# Patient Record
Sex: FEMALE | Race: WHITE | Employment: OTHER | URBAN - METROPOLITAN AREA
[De-identification: names, ages, dates, MRNs, and addresses within clinical notes are randomized per-mention and may not be internally consistent; named-entity substitution may affect disease eponyms.]

---

## 2021-04-20 ENCOUNTER — APPOINTMENT (OUTPATIENT)
Dept: GENERAL RADIOLOGY | Age: 76
DRG: 084 | End: 2021-04-20
Payer: MEDICARE

## 2021-04-20 ENCOUNTER — HOSPITAL ENCOUNTER (INPATIENT)
Age: 76
LOS: 6 days | Discharge: HOME | DRG: 084 | End: 2021-04-26
Attending: EMERGENCY MEDICINE | Admitting: SURGERY
Payer: MEDICARE

## 2021-04-20 ENCOUNTER — APPOINTMENT (OUTPATIENT)
Dept: CT IMAGING | Age: 76
DRG: 084 | End: 2021-04-20
Payer: MEDICARE
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
## 2021-04-20 DIAGNOSIS — S06.5XAA SUBDURAL HEMATOMA: Primary | ICD-10-CM

## 2021-04-20 PROBLEM — S00.81XA FACIAL ABRASION, INITIAL ENCOUNTER: Status: ACTIVE | Noted: 2021-04-20

## 2021-04-20 PROBLEM — S06.0XAA CONCUSSION: Status: ACTIVE | Noted: 2021-04-20

## 2021-04-20 LAB
ABO/RH: NORMAL
ALBUMIN SERPL-MCNC: 3.1 G/DL (ref 3.5–5.2)
ALP BLD-CCNC: 137 U/L (ref 35–104)
ALT SERPL-CCNC: 34 U/L (ref 0–32)
ANGLE (CLOT STRENGTH): 78.6 DEGREE (ref 59–74)
ANION GAP SERPL CALCULATED.3IONS-SCNC: 10 MMOL/L (ref 7–16)
ANTIBODY SCREEN: NORMAL
APTT: 31.8 SEC (ref 24.5–35.1)
AST SERPL-CCNC: 29 U/L (ref 0–31)
BASOPHILS ABSOLUTE: 0.02 E9/L (ref 0–0.2)
BASOPHILS RELATIVE PERCENT: 0.3 % (ref 0–2)
BILIRUB SERPL-MCNC: 0.2 MG/DL (ref 0–1.2)
BUN BLDV-MCNC: 25 MG/DL (ref 8–23)
CALCIUM SERPL-MCNC: 9 MG/DL (ref 8.6–10.2)
CHLORIDE BLD-SCNC: 100 MMOL/L (ref 98–107)
CO2: 29 MMOL/L (ref 22–29)
CREAT SERPL-MCNC: 1.1 MG/DL (ref 0.5–1)
EOSINOPHILS ABSOLUTE: 0.07 E9/L (ref 0.05–0.5)
EOSINOPHILS RELATIVE PERCENT: 1 % (ref 0–6)
EPL-TEG: 0.8 % (ref 0–15)
G-TEG: 15.3 K D/SC (ref 4.5–11)
GFR AFRICAN AMERICAN: 59
GFR NON-AFRICAN AMERICAN: 48 ML/MIN/1.73
GLUCOSE BLD-MCNC: 450 MG/DL (ref 74–99)
HCT VFR BLD CALC: 32.1 % (ref 34–48)
HEMOGLOBIN: 10.3 G/DL (ref 11.5–15.5)
IMMATURE GRANULOCYTES #: 0.03 E9/L
IMMATURE GRANULOCYTES %: 0.4 % (ref 0–5)
INR BLD: 1.3
K (CLOTTING TIME): 0.8 MIN (ref 1–3)
LY30 (FIBRINOLYSIS): 0.8 % (ref 0–8)
LYMPHOCYTES ABSOLUTE: 1.59 E9/L (ref 1.5–4)
LYMPHOCYTES RELATIVE PERCENT: 23.7 % (ref 20–42)
MA (MAX AMPLITUDE): 75.4 MM (ref 50–70)
MCH RBC QN AUTO: 27.7 PG (ref 26–35)
MCHC RBC AUTO-ENTMCNC: 32.1 % (ref 32–34.5)
MCV RBC AUTO: 86.3 FL (ref 80–99.9)
MONOCYTES ABSOLUTE: 0.51 E9/L (ref 0.1–0.95)
MONOCYTES RELATIVE PERCENT: 7.6 % (ref 2–12)
NEUTROPHILS ABSOLUTE: 4.49 E9/L (ref 1.8–7.3)
NEUTROPHILS RELATIVE PERCENT: 67 % (ref 43–80)
PDW BLD-RTO: 13.2 FL (ref 11.5–15)
PLATELET # BLD: 294 E9/L (ref 130–450)
PMV BLD AUTO: 9.6 FL (ref 7–12)
POTASSIUM SERPL-SCNC: 4.8 MMOL/L (ref 3.5–5)
PROTHROMBIN TIME: 15 SEC (ref 9.3–12.4)
R (REACTION TIME): 4.8 MIN (ref 5–10)
RBC # BLD: 3.72 E12/L (ref 3.5–5.5)
SODIUM BLD-SCNC: 139 MMOL/L (ref 132–146)
TOTAL PROTEIN: 6.4 G/DL (ref 6.4–8.3)
WBC # BLD: 6.7 E9/L (ref 4.5–11.5)

## 2021-04-20 PROCEDURE — 73130 X-RAY EXAM OF HAND: CPT

## 2021-04-20 PROCEDURE — 85025 COMPLETE CBC W/AUTO DIFF WBC: CPT

## 2021-04-20 PROCEDURE — 2060000000 HC ICU INTERMEDIATE R&B

## 2021-04-20 PROCEDURE — 85347 COAGULATION TIME ACTIVATED: CPT

## 2021-04-20 PROCEDURE — 71045 X-RAY EXAM CHEST 1 VIEW: CPT

## 2021-04-20 PROCEDURE — 99284 EMERGENCY DEPT VISIT MOD MDM: CPT

## 2021-04-20 PROCEDURE — 80053 COMPREHEN METABOLIC PANEL: CPT

## 2021-04-20 PROCEDURE — 85384 FIBRINOGEN ACTIVITY: CPT

## 2021-04-20 PROCEDURE — 72170 X-RAY EXAM OF PELVIS: CPT

## 2021-04-20 PROCEDURE — 85730 THROMBOPLASTIN TIME PARTIAL: CPT

## 2021-04-20 PROCEDURE — 85610 PROTHROMBIN TIME: CPT

## 2021-04-20 PROCEDURE — 86850 RBC ANTIBODY SCREEN: CPT

## 2021-04-20 PROCEDURE — 70450 CT HEAD/BRAIN W/O DYE: CPT

## 2021-04-20 PROCEDURE — 86900 BLOOD TYPING SEROLOGIC ABO: CPT

## 2021-04-20 PROCEDURE — 85576 BLOOD PLATELET AGGREGATION: CPT

## 2021-04-20 PROCEDURE — 86901 BLOOD TYPING SEROLOGIC RH(D): CPT

## 2021-04-20 PROCEDURE — 73552 X-RAY EXAM OF FEMUR 2/>: CPT

## 2021-04-20 RX ORDER — BACITRACIN, NEOMYCIN, POLYMYXIN B 400; 3.5; 5 [USP'U]/G; MG/G; [USP'U]/G
OINTMENT TOPICAL 2 TIMES DAILY
Status: DISCONTINUED | OUTPATIENT
Start: 2021-04-20 | End: 2021-04-26 | Stop reason: HOSPADM

## 2021-04-20 RX ORDER — INSULIN GLARGINE 100 [IU]/ML
INJECTION, SOLUTION SUBCUTANEOUS NIGHTLY
COMMUNITY

## 2021-04-20 RX ORDER — ASPIRIN 81 MG/1
81 TABLET ORAL DAILY
COMMUNITY

## 2021-04-20 RX ORDER — ONDANSETRON 4 MG/1
4 TABLET, ORALLY DISINTEGRATING ORAL EVERY 8 HOURS PRN
Status: DISCONTINUED | OUTPATIENT
Start: 2021-04-20 | End: 2021-04-26 | Stop reason: HOSPADM

## 2021-04-20 RX ORDER — ACETAMINOPHEN 325 MG/1
650 TABLET ORAL EVERY 4 HOURS
Status: DISCONTINUED | OUTPATIENT
Start: 2021-04-20 | End: 2021-04-21

## 2021-04-20 RX ORDER — DEXTROSE MONOHYDRATE 25 G/50ML
12.5 INJECTION, SOLUTION INTRAVENOUS PRN
Status: DISCONTINUED | OUTPATIENT
Start: 2021-04-20 | End: 2021-04-26 | Stop reason: HOSPADM

## 2021-04-20 RX ORDER — ONDANSETRON 2 MG/ML
4 INJECTION INTRAMUSCULAR; INTRAVENOUS EVERY 6 HOURS PRN
Status: DISCONTINUED | OUTPATIENT
Start: 2021-04-20 | End: 2021-04-26 | Stop reason: HOSPADM

## 2021-04-20 RX ORDER — SODIUM CHLORIDE 0.9 % (FLUSH) 0.9 %
10 SYRINGE (ML) INJECTION PRN
Status: DISCONTINUED | OUTPATIENT
Start: 2021-04-20 | End: 2021-04-26 | Stop reason: HOSPADM

## 2021-04-20 RX ORDER — INSULIN GLARGINE 100 [IU]/ML
22 INJECTION, SOLUTION SUBCUTANEOUS EVERY MORNING
COMMUNITY

## 2021-04-20 RX ORDER — MULTIVIT WITH MINERALS/LUTEIN
1000 TABLET ORAL DAILY
COMMUNITY

## 2021-04-20 RX ORDER — ATORVASTATIN CALCIUM 40 MG/1
40 TABLET, FILM COATED ORAL DAILY
COMMUNITY

## 2021-04-20 RX ORDER — SODIUM CHLORIDE 0.9 % (FLUSH) 0.9 %
10 SYRINGE (ML) INJECTION EVERY 12 HOURS SCHEDULED
Status: DISCONTINUED | OUTPATIENT
Start: 2021-04-20 | End: 2021-04-26 | Stop reason: HOSPADM

## 2021-04-20 RX ORDER — LEVETIRACETAM 500 MG/1
500 TABLET ORAL 2 TIMES DAILY
Status: DISCONTINUED | OUTPATIENT
Start: 2021-04-20 | End: 2021-04-26 | Stop reason: HOSPADM

## 2021-04-20 RX ORDER — INSULIN ASPART 100 [IU]/ML
INJECTION, SOLUTION INTRAVENOUS; SUBCUTANEOUS
COMMUNITY

## 2021-04-20 RX ORDER — SODIUM CHLORIDE 9 MG/ML
25 INJECTION, SOLUTION INTRAVENOUS PRN
Status: DISCONTINUED | OUTPATIENT
Start: 2021-04-20 | End: 2021-04-26 | Stop reason: HOSPADM

## 2021-04-20 RX ORDER — DEXTROSE MONOHYDRATE 50 MG/ML
100 INJECTION, SOLUTION INTRAVENOUS PRN
Status: DISCONTINUED | OUTPATIENT
Start: 2021-04-20 | End: 2021-04-26 | Stop reason: HOSPADM

## 2021-04-20 RX ORDER — NICOTINE POLACRILEX 4 MG
15 LOZENGE BUCCAL PRN
Status: DISCONTINUED | OUTPATIENT
Start: 2021-04-20 | End: 2021-04-26 | Stop reason: HOSPADM

## 2021-04-20 RX ORDER — NEOMYCIN SULFATE, POLYMYXIN B SULFATE AND BACITRACIN ZINC 3.5; 10000; 4 MG/G; [USP'U]/G; [USP'U]/G
OINTMENT OPHTHALMIC EVERY 6 HOURS SCHEDULED
Status: DISCONTINUED | OUTPATIENT
Start: 2021-04-20 | End: 2021-04-26 | Stop reason: HOSPADM

## 2021-04-20 RX ORDER — SENNA AND DOCUSATE SODIUM 50; 8.6 MG/1; MG/1
2 TABLET, FILM COATED ORAL 2 TIMES DAILY
Status: DISCONTINUED | OUTPATIENT
Start: 2021-04-20 | End: 2021-04-26 | Stop reason: HOSPADM

## 2021-04-20 RX ORDER — CLOPIDOGREL BISULFATE 75 MG/1
75 TABLET ORAL DAILY
COMMUNITY

## 2021-04-20 RX ORDER — SODIUM CHLORIDE 9 MG/ML
INJECTION, SOLUTION INTRAVENOUS CONTINUOUS
Status: DISCONTINUED | OUTPATIENT
Start: 2021-04-20 | End: 2021-04-22

## 2021-04-20 ASSESSMENT — PAIN DESCRIPTION - LOCATION: LOCATION: HEAD

## 2021-04-20 ASSESSMENT — PAIN DESCRIPTION - PAIN TYPE
TYPE: ACUTE PAIN
TYPE: ACUTE PAIN

## 2021-04-20 NOTE — H&P
TRAUMA HISTORY & PHYSICAL  Surgical Resident/Advance Practice Nurse  4/20/2021  6:33 PM    PRIMARY SURVEY    CHIEF COMPLAINT:  Trauma consult. Injury occurred this morning. Pt is a 77year old female that was transferred from [facility]. She states that she had a mechanical fall this morning where she tripped over her feet. She states she hit her head, but did not lose consciousness. She is complaining of a mild headache and face pain. She also complains of some left hand pain from when she landed. She has a history of CAD s/p multiple stents and questionable Afib for which she takes ASA/Plavix and Eliquis. She denies any nausea, vomiting, or changes in vision. AIRWAY:   Airway Normal  EMS ETT Absent  Noisy respirations Absent  Retractions: Absent  Vomiting/bleeding: Absent      BREATHING:    Midaxillary breath sound left:  Normal  Midaxillary breath sound right:  Normal    Cough sound intensity:  good   FiO2: Room Air  SMI 1500 mL.       CIRCULATION:   Femerol pulse intensity: Strong  Palpebral conjunctiva: Pink       Vitals:    04/20/21 1733   BP: (!) 109/54   Pulse: 101   Resp: 20   Temp: 97.7 °F (36.5 °C)   SpO2: 94%       Vitals:    04/20/21 1733   BP: (!) 109/54   Pulse: 101   Resp: 20   Temp: 97.7 °F (36.5 °C)   TempSrc: Infrared   SpO2: 94%   Weight: 145 lb (65.8 kg)   Height: 5' 6\" (1.676 m)        FAST EXAM: Deferred    Central Nervous System    GCS Initial 15 minutes   Eye  Motor  Verbal 4 - Opens eyes on own  6 - Follows simple motor commands  5 - Alert and oriented 4 - Opens eyes on own  6 - Follows simple motor commands  5 - Alert and oriented     Neuromuscular blockade: No  Pupil size:  Left 4 mm    Right 4 mm  Pupil reaction: Yes    Wiggles fingers: Left Yes Right Yes  Wiggles toes: Left Yes   Right No toes on the right    Hand grasp:   Left  Present      Right  Present  Plantar flexion: Left  Present      Right   Present    Loss of consciousness:  No  History Obtained From:  Patient & EMS Private Medical Doctor: Sofi Turcios MD      Pre-exisiting Medical History:  yes    Conditions: CAD, HLD, DM, ? Afib    Medications: ASA/Plavix, Eliquis, vitamins, \"many other medications\"    Allergies: NKDA    Social History:   Tobacco use:  none  Alcohol use:  none  Illicit drug use:  no history of illicit drug use    Past Surgical History:  Right toe amputations, open cholecystectomy    Anticoagulant use:  Yes Eliquis  Antiplatelet use: Yes ASA/Plavix    NSAID use in last 72 hours: no  Taken PCN in past:  unknown  Last food/drink: Unknown  Last tetanus: Unknown    Family History:   Not pertinent to presenting problem. Complaints:   Head:  Mild  Neck:   None  Chest:   None  Back:   None  Abdomen:   None  Extremities:   Mild  Comments: headache and right hand pain    Review of systems:  All negative unless otherwise noted. SECONDARY SURVEY  Head/scalp: Cepahlohematoma to forehead    Face: Abrasion to left cheek    Eyes/ears/nose: Abrasion to nose    Pharynx/mouth: Atraumatic    Neck: Atraumatic     Cervical spine tenderness:   Cervical collar not indicated  Pain:  none  ROM:  Full    Chest wall:  Atraumatic    Heart:  Regular rate & rhythm    Abdomen: Atraumatic. Soft ND  Tenderness:  none    Pelvis: Atraumatic  Tenderness: none    Thoracolumbar spine: Atraumatic  Tenderness:  none    Genitourinary:  Atraumatic. No blood or urine noted    Rectum: Atraumatic. No blood noted. Perineum: Atraumatic. No blood or urine noted.       Extremities:   RUE Abrasion over hand with ecchymosis to right thumb and middle digit  LUE Atraumatic  RLE Prev toe amputations with ulceration to plantar surface of foot  LLE Atraumatic  Sensory normal  Motor normal    Distal Pulses  Left arm normal  Right arm normal  Left leg normal  Right leg normal    Capillary refill  Left arm normal  Right arm normal  Left leg normal  Right leg normal    Procedures in ED:  None    In the event of Emergency Blood Transfusion:  Due to the critical condition of this patient, I request the immediate release of blood products for emergency transfusion secondary to shock. I understand the increased risks incurred by the lack of complete transfusion testing.       Radiology: CT Head  and CT Cervical spine     Consultations:  Neurosurgery    Admission/Diagnosis: Trauma, SDH    Plan of Treatment:  - Admit to tele  - Tert  - Labs, TEG  - repeat head CT @ 7pm  - Keppra BID x 7 days  - Hold anticoagulation  - NSG consult  - Pain control  - Local wound care to abrasions of face and right hand  - Right hand XR    Plan discussed with Dr. Colton Otero at 4/20/2021 on 6:33 PM    Electronically signed by Cristian Morgan MD on 4/20/2021 at 6:33 PM

## 2021-04-21 LAB
ANION GAP SERPL CALCULATED.3IONS-SCNC: 9 MMOL/L (ref 7–16)
BASOPHILS ABSOLUTE: 0.03 E9/L (ref 0–0.2)
BASOPHILS RELATIVE PERCENT: 0.4 % (ref 0–2)
BUN BLDV-MCNC: 21 MG/DL (ref 8–23)
CALCIUM SERPL-MCNC: 9.2 MG/DL (ref 8.6–10.2)
CHLORIDE BLD-SCNC: 100 MMOL/L (ref 98–107)
CO2: 31 MMOL/L (ref 22–29)
CREAT SERPL-MCNC: 1 MG/DL (ref 0.5–1)
EOSINOPHILS ABSOLUTE: 0.12 E9/L (ref 0.05–0.5)
EOSINOPHILS RELATIVE PERCENT: 1.5 % (ref 0–6)
GFR AFRICAN AMERICAN: >60
GFR NON-AFRICAN AMERICAN: 54 ML/MIN/1.73
GLUCOSE BLD-MCNC: 88 MG/DL (ref 74–99)
HCT VFR BLD CALC: 35.3 % (ref 34–48)
HEMOGLOBIN: 11.1 G/DL (ref 11.5–15.5)
IMMATURE GRANULOCYTES #: 0.03 E9/L
IMMATURE GRANULOCYTES %: 0.4 % (ref 0–5)
LYMPHOCYTES ABSOLUTE: 2.99 E9/L (ref 1.5–4)
LYMPHOCYTES RELATIVE PERCENT: 37.8 % (ref 20–42)
MCH RBC QN AUTO: 27.5 PG (ref 26–35)
MCHC RBC AUTO-ENTMCNC: 31.4 % (ref 32–34.5)
MCV RBC AUTO: 87.6 FL (ref 80–99.9)
METER GLUCOSE: 128 MG/DL (ref 74–99)
METER GLUCOSE: 352 MG/DL (ref 74–99)
METER GLUCOSE: 370 MG/DL (ref 74–99)
METER GLUCOSE: 440 MG/DL (ref 74–99)
MONOCYTES ABSOLUTE: 0.88 E9/L (ref 0.1–0.95)
MONOCYTES RELATIVE PERCENT: 11.1 % (ref 2–12)
NEUTROPHILS ABSOLUTE: 3.86 E9/L (ref 1.8–7.3)
NEUTROPHILS RELATIVE PERCENT: 48.8 % (ref 43–80)
PDW BLD-RTO: 13.3 FL (ref 11.5–15)
PLATELET # BLD: 323 E9/L (ref 130–450)
PMV BLD AUTO: 9.6 FL (ref 7–12)
POTASSIUM REFLEX MAGNESIUM: 4 MMOL/L (ref 3.5–5)
RBC # BLD: 4.03 E12/L (ref 3.5–5.5)
SODIUM BLD-SCNC: 140 MMOL/L (ref 132–146)
TROPONIN: 0.03 NG/ML (ref 0–0.03)
WBC # BLD: 7.9 E9/L (ref 4.5–11.5)

## 2021-04-21 PROCEDURE — 2580000003 HC RX 258: Performed by: STUDENT IN AN ORGANIZED HEALTH CARE EDUCATION/TRAINING PROGRAM

## 2021-04-21 PROCEDURE — 97530 THERAPEUTIC ACTIVITIES: CPT

## 2021-04-21 PROCEDURE — 85025 COMPLETE CBC W/AUTO DIFF WBC: CPT

## 2021-04-21 PROCEDURE — 82962 GLUCOSE BLOOD TEST: CPT

## 2021-04-21 PROCEDURE — 99223 1ST HOSP IP/OBS HIGH 75: CPT | Performed by: SURGERY

## 2021-04-21 PROCEDURE — 36415 COLL VENOUS BLD VENIPUNCTURE: CPT

## 2021-04-21 PROCEDURE — 97166 OT EVAL MOD COMPLEX 45 MIN: CPT

## 2021-04-21 PROCEDURE — 84484 ASSAY OF TROPONIN QUANT: CPT

## 2021-04-21 PROCEDURE — 6370000000 HC RX 637 (ALT 250 FOR IP): Performed by: STUDENT IN AN ORGANIZED HEALTH CARE EDUCATION/TRAINING PROGRAM

## 2021-04-21 PROCEDURE — 97161 PT EVAL LOW COMPLEX 20 MIN: CPT

## 2021-04-21 PROCEDURE — 6370000000 HC RX 637 (ALT 250 FOR IP): Performed by: SURGERY

## 2021-04-21 PROCEDURE — 80048 BASIC METABOLIC PNL TOTAL CA: CPT

## 2021-04-21 PROCEDURE — 2060000000 HC ICU INTERMEDIATE R&B

## 2021-04-21 PROCEDURE — 2580000003 HC RX 258: Performed by: SURGERY

## 2021-04-21 PROCEDURE — 93005 ELECTROCARDIOGRAM TRACING: CPT | Performed by: STUDENT IN AN ORGANIZED HEALTH CARE EDUCATION/TRAINING PROGRAM

## 2021-04-21 RX ORDER — 0.9 % SODIUM CHLORIDE 0.9 %
500 INTRAVENOUS SOLUTION INTRAVENOUS ONCE
Status: COMPLETED | OUTPATIENT
Start: 2021-04-21 | End: 2021-04-21

## 2021-04-21 RX ORDER — ATORVASTATIN CALCIUM 40 MG/1
40 TABLET, FILM COATED ORAL DAILY
Status: DISCONTINUED | OUTPATIENT
Start: 2021-04-21 | End: 2021-04-26 | Stop reason: HOSPADM

## 2021-04-21 RX ORDER — INSULIN GLARGINE 100 [IU]/ML
22 INJECTION, SOLUTION SUBCUTANEOUS EVERY MORNING
Status: DISCONTINUED | OUTPATIENT
Start: 2021-04-21 | End: 2021-04-23

## 2021-04-21 RX ORDER — ACETAMINOPHEN 325 MG/1
650 TABLET ORAL EVERY 4 HOURS PRN
Status: DISCONTINUED | OUTPATIENT
Start: 2021-04-21 | End: 2021-04-26 | Stop reason: HOSPADM

## 2021-04-21 RX ADMIN — LEVETIRACETAM 500 MG: 500 TABLET ORAL at 09:41

## 2021-04-21 RX ADMIN — Medication 10 ML: at 02:44

## 2021-04-21 RX ADMIN — ACETAMINOPHEN 650 MG: 325 TABLET ORAL at 06:36

## 2021-04-21 RX ADMIN — INSULIN GLARGINE 22 UNITS: 100 INJECTION, SOLUTION SUBCUTANEOUS at 09:41

## 2021-04-21 RX ADMIN — NEOMYCIN SULFATE, POLYMYXIN B SULFATE AND BACITRACIN ZINC: 3.5; 10000; 4 OINTMENT OPHTHALMIC at 21:51

## 2021-04-21 RX ADMIN — INSULIN LISPRO 9 UNITS: 100 INJECTION, SOLUTION INTRAVENOUS; SUBCUTANEOUS at 02:58

## 2021-04-21 RX ADMIN — ATORVASTATIN CALCIUM 40 MG: 40 TABLET, FILM COATED ORAL at 12:43

## 2021-04-21 RX ADMIN — SODIUM CHLORIDE: 9 INJECTION, SOLUTION INTRAVENOUS at 02:58

## 2021-04-21 RX ADMIN — LEVETIRACETAM 500 MG: 500 TABLET ORAL at 21:51

## 2021-04-21 RX ADMIN — ACETAMINOPHEN 650 MG: 325 TABLET ORAL at 17:51

## 2021-04-21 RX ADMIN — INSULIN LISPRO 15 UNITS: 100 INJECTION, SOLUTION INTRAVENOUS; SUBCUTANEOUS at 12:43

## 2021-04-21 RX ADMIN — ACETAMINOPHEN 650 MG: 325 TABLET ORAL at 03:05

## 2021-04-21 RX ADMIN — LEVETIRACETAM 500 MG: 500 TABLET ORAL at 00:32

## 2021-04-21 RX ADMIN — SODIUM CHLORIDE 500 ML: 9 INJECTION, SOLUTION INTRAVENOUS at 09:24

## 2021-04-21 RX ADMIN — NEOMYCIN SULFATE, POLYMYXIN B SULFATE AND BACITRACIN ZINC: 3.5; 10000; 4 OINTMENT OPHTHALMIC at 06:38

## 2021-04-21 RX ADMIN — INSULIN LISPRO 7 UNITS: 100 INJECTION, SOLUTION INTRAVENOUS; SUBCUTANEOUS at 23:01

## 2021-04-21 ASSESSMENT — ENCOUNTER SYMPTOMS
NAUSEA: 0
SHORTNESS OF BREATH: 0
ABDOMINAL PAIN: 0
VOMITING: 0
FACIAL SWELLING: 1
BACK PAIN: 0
COUGH: 0
PHOTOPHOBIA: 0
TROUBLE SWALLOWING: 0
CHEST TIGHTNESS: 0
COLOR CHANGE: 1

## 2021-04-21 ASSESSMENT — PAIN SCALES - GENERAL
PAINLEVEL_OUTOF10: 0
PAINLEVEL_OUTOF10: 7
PAINLEVEL_OUTOF10: 0
PAINLEVEL_OUTOF10: 10

## 2021-04-21 ASSESSMENT — PAIN DESCRIPTION - LOCATION: LOCATION: HEAD

## 2021-04-21 ASSESSMENT — PAIN DESCRIPTION - PAIN TYPE: TYPE: ACUTE PAIN

## 2021-04-21 NOTE — CARE COORDINATION
Met with pt at the bedside. She lives alone in 7th floor apt with elevator access. She has a [employer], rollator, shower chair, elevated toilet seat and glucometer. She has home care services and  assistance with meds through [facility] in  [place] [facility]. She plans to return home at discharge and denies any new needs. PCP Dr Trent Jordan. [facility].

## 2021-04-21 NOTE — PROGRESS NOTES
Occupational Therapy  OCCUPATIONAL THERAPY INITIAL EVALUATION      Date:2021  Patient Name: Hakan Rodriguez  MRN: [id number]  : 1945  Room: [address] [place] [address]    Referring Provider: Mona Mcdowell MD    Evaluating OT: Vaibhav Ludwig OTR/L #[id number]    AM-PAC Daily Activity Raw Score: 16    Recommended Adaptive Equipment: TBD     Diagnosis: Fall from standing, initial encounter [W19. XXXA]  Fall from standing, initial encounter [W19. XXXA]   SDH    Surgery/Procedure: none     Pertinent Medical History:    History reviewed. No pertinent past medical history. Precautions:  Falls, orthostatic hypotension, R heel wound, R TMA     Home Living: Pt lives alone in 7th floor apartment w/ no GABBY and elevator access   Bathroom setup: tub/shower w/ shower chair w/ grab bars in shower; raised commode  Equipment owned: ww, rollator    Prior Level of Function: Independent with ADLs , Independent with IADLs; ambulated w/ ww and rollator prn when fatigued with activity to utilize seat  Driving: No, family or services provide transportation     Pain Level: Pt c/o 4-5/10 head pain, educated pt on pain management  Cognition: A&O: 3/4(pt unsure of how she fell); Follows 1-2 step directions   Memory:  fair    Sequencing:  fair    Problem solving: fair    Judgement/safety: fair      Functional Assessment:   Initial Eval Status  Date: 21 Treatment Status  Date: STGs/LTGs  Treatment frequency: 1-4x/wk   Feeding Independent      Grooming Stand by Assist /set-up (EOB)  Modified [place]    UB Dressing Minimal Assist   Modified [place]    LB Dressing Moderate Assist   Modified [place]    Bathing Moderate Assist  Modified [place]    Toileting Moderate Assist   Modified [place]    Bed Mobility  Supine to sit: Supervision   Sit to supine: Supervision   Supine to sit:  Independent   Sit to supine: Independent    Functional Transfers Stand by Assist   Independent    Functional Mobility Minimal Assist w/ ww (few steps before becoming unresponsive d/t hypotensive)  Modified [place]    Balance Sitting:     Static: SBA    Dynamic: Min. A  Standing: w/ ww    Static: Min. A    Dynamic: Min. A     Activity Tolerance Fair (limited d/t hypotension)  good   Visual/  Perceptual Glasses: Yes        Safety          Hand dominance: R     ROM Strength  Additional Info:    RUE  WFL 4/5 good  and wfl FMC/dexterity noted during ADL tasks       LUE WFL 4/5 good  and wfl FMC/dexterity noted during ADL tasks         Hearing: WFL   Sensation:  No c/o numbness or tingling   Tone: WFL   Edema: facial bruising noted            Treatment: Upon arrival, patient lying in bed and agreeable to OT session at this time in collaboration with PT. RN approved. Therapist facilitated bed mobility, functional transfers (EOB sit<>stand w/ cueing on hand placement), standing tolerance tasks(addressing posture) and functional mobility task with ww (cuing on posture, w/w management and safety) - skilled cuing on hand placement, posture, body mechanics and safety. Pt became unresponsive with few steps, assisted pt back to bed where pt reported she was dizzy and became more alert. Assessed BP in sitting and standing. BP TFN=587/55 and standing=70/47. Therapist facilitated self-care retraining: UB/LB self-care tasks(socks-educated pt on modified technique for safety) and seated grooming task while educating pt on modified techniques, posture, safety and energy conservation techniques. Skilled monitoring of HR, O2 sats and pts response to treatment. At end of session, patient lying in bed w/ HOB elevated with call light and phone within reach, all lines and tubes intact. RN aware of pt's performance and status. Comments:  Overall pt demonstrated decreased independence and safety during completion of ADL/functional transfers/mobility tasks.  Pt demonstrating fair+ understanding of education/techniques, requiring additional training / education. Pt would benefit from continued skilled OT to increase functional independence and quality of life. Eval Complexity: Mod    Assessment of current deficits   Functional mobility [x]  ADLs [x] Strength [x]  Cognition []  Functional transfers  [x] IADLs [x] Safety Awareness [x]  Endurance [x]  Fine Motor Coordination [] Balance [x] Vision/perception [] Sensation []   Gross Motor Coordination [] ROM [] Delirium []                  Motor Control []    Plan of Care: 1-3 days/week for 1-2 weeks PRN   Instruction/training on adapted ADL techniques and AE recommendations to increase functional independence within precautions  Training on energy conservation strategies/techniques to improve independence/tolerance for self-care routine  Functional transfer/mobility training/DME recommendations for increased independence, safety, and fall prevention  Patient/Family education to increase follow through with safety techniques and functional independence  Recommendation of environmental modifications for increased safety with functional transfers/mobility and ADLs  Therapeutic exercise to improve motor endurance, ROM, and functional strength for ADLs/functional transfers  Therapeutic activities to facilitate/challenge dynamic balance, stand tolerance, fine motor dexterity/in-hand manipulation for increased independence with ADLs  Neuro-muscular re-education: facilitation of righting/equilibrium reactions, midline orientation, scapular stability/mobility, normalization of muscle tone, and facilitation of volitional active controled movement  Positioning to improve functional independence      Rehab Potential:  Good for established goals     Patient / Family Goal: to go home      Patient and/or family were instructed on functional diagnosis, prognosis/goals and OT plan of care. Demonstrated good understanding.       Time In: 7:30  Time Out: 8:00  Total Treatment Time: 15 minutes    Min Units   OT Eval Low [address]  x 1   OT Eval High 74706       OT Re-Eval [id number]       Therapeutic Ex 49222       Therapeutic Activities 42517  10  1   ADL/Self Care 62123  5     Orthotic Management 95328       Neuro Re-Ed 00541       Non-Billable Time          Evaluation Time includes thorough review of current medical information, gathering information on past medical history/social history and prior level of function, completion of standardized testing/informal observation of tasks, assessment of data and education on plan of care and goals.     Altagracia Celaya, OTR/L #[id number]

## 2021-04-21 NOTE — PROGRESS NOTES
Comprehensive Nutrition Assessment    Type and Reason for Visit:  Initial, Positive Nutrition Screen    Nutrition Recommendations/Plan: Continue current diet, Start Ensure BID    Nutrition Assessment:  Pt adm w/ SDH s/p fall, noted wounds. Hx CAD. Will provide ONS and monitor    Malnutrition Assessment:  Malnutrition Status: At risk for malnutrition (Comment)    Context:  Acute Illness     Findings of the 6 clinical characteristics of malnutrition:  Energy Intake:  Mild decrease in energy intake (Comment)  Weight Loss:  Unable to assess(d/t lack of wt hx)     Body Fat Loss:  No significant body fat loss     Muscle Mass Loss:  No significant muscle mass loss    Fluid Accumulation:  No significant fluid accumulation     Strength:  Not Performed    Estimated Daily Nutrient Needs:  Energy (kcal):  (MSJ 1169 x 1.2 SF); Weight Used for Energy Requirements:  Current     Protein (g):  80-90; Weight Used for Protein Requirements:  Current(1.2-1.4)        Fluid (ml/day):  ; Method Used for Fluid Requirements:  1 ml/kcal      Nutrition Related Findings:  pt alert, soft abd, active BS, no noted edema, no fluids noted at this time      Wounds:  Multiple, Open Wounds(noted lacerations, abrasions)       Current Nutrition Therapies:    DIET GENERAL;     Anthropometric Measures:  · Height: 5' 6\" (167.6 cm)  · Current Body Weight: 145 lb (65.8 kg)(stated)   · Usual Body Weight: (UTO no wt hx per EMR)     · Ideal Body Weight: 130 lbs; % Ideal Body Weight 111.5 %   · BMI: 23.4  · BMI Categories: Normal Weight (BMI 22.0 to 24.9) age over 72       Nutrition Diagnosis:   · Increased nutrient needs related to increase demand for energy/nutrients as evidenced by wounds      Nutrition Interventions:   Food and/or Nutrient Delivery:  Continue Current Diet, Start Oral Nutrition Supplement(Ensure BID)  Nutrition Education/Counseling:  Education not indicated   Coordination of Nutrition Care:  Continue to monitor while inpatient    Goals:  Consume >50% meals/ONS       Nutrition Monitoring and Evaluation:   Food/Nutrient Intake Outcomes:  Diet Advancement/Tolerance, Food and Nutrient Intake, Supplement Intake  Physical Signs/Symptoms Outcomes:  Biochemical Data, GI Status, Fluid Status or Edema, Nutrition Focused Physical Findings, Skin, Weight     Discharge Planning:     Too soon to determine     Electronically signed by Humphrey Esparza MS, RD, LD on 4/21/21 at 1:55 PM EDT    Contact: [phone]

## 2021-04-21 NOTE — PROGRESS NOTES
Per patient, [facility] healthcare is [facility]. They supply her medications, does not have an OP pharmacy she goes to.
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
## 2021-04-21 NOTE — PROGRESS NOTES
Notified by therapy that patient became very dizzy in the bathroom and was close to passing out. Her BP when standing was 70/47. Patient back in bed and /54. Melanie Vang NP notified.

## 2021-04-21 NOTE — PROGRESS NOTES
TRAUMA TERTIARY    Admit Date: 4/20/2021    [facility]    CC:    Chief Complaint   Patient presents with    Head Injury     [address] tx from [facility]       Alcohol pre-screening:  How many times in the past year have you had 4-5 or more drinks in a day?  none    Subjective:       Patient endorsing headache this morning. Patient denying change in cognition. Patient denying abdominal pain nausea or vomiting. Objective:     Patient Vitals for the past 8 hrs:   BP Temp Temp src Pulse Resp SpO2   04/21/21 0357 117/62 97.7 °F (36.5 °C) Temporal 90 20 97 %   04/21/21 0100 (!) 106/94 98 °F (36.7 °C) Temporal 98 18 96 %       No intake/output data recorded. No intake/output data recorded. Radiology:  CT HEAD WO CONTRAST   Final Result   1. Known right frontal parafalcine subdural hematoma measures approximately 7   mm in depth with minimal local gyral effacement. No prior exam available   from outside facility. Short-term follow-up recommended. 2. No evidence of acute intracranial edema. 3. Hematoma overlies frontal calvarium. XR FEMUR LEFT (MIN 2 VIEWS)   Final Result   No fracture or dislocation of left hip. XR PELVIS (1-2 VIEWS)   Final Result   Linear lucency involving the left femoral neck. Findings worrisome for   fracture. Dedicated views of the left hip recommended if clinically   warranted. XR CHEST PORTABLE   Final Result   No acute process. XR HAND RIGHT (MIN 3 VIEWS)   Final Result   No acute osseous injury of the right hand is identified. PHYSICAL EXAM:   GCS:  4 - Opens eyes on own   6 - Follows simple motor commands  5 - Alert and oriented    Pupil size: Left 4 mm     Right 4 mm  Pupil reaction: Yes  Wiggles fingers: Left Yes     Right Yes  Wiggles toes: Left Yes     Right Yes  Plantar flexion: Left normal     Right normal    GENERAL:  NAD. A&Ox3. HEAD: Frontal scalp hematoma.   Left periorbital ecchymosis and superficial abrasion  LUNGS:  No increased work of breathing. CTAB. CARDIOVASCULAR: RR  ABDOMEN:  Soft, non-distended, non-tender. No guarding, rigidity, rebound. 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 and strikethrough on plantar surface from known plantar ulcer  SKIN:  Warm and dry      Spine:       Spine Tenderness ROM   Cervical 0 /10 Normal   Thoracic 0 /10 Normal   Lumbar 0 /10 Normal     Musculoskeletal:    Joint Tenderness Swelling/Deformity ROM   Right shoulder absent absent normal   Left shoulder absent absent normal   Right elbow absent absent normal   Left elbow absent absent normal   Right wrist absent absent normal   Left wrist absent absent normal   Right hand grasp absent absent normal   Left hand grasp absent absent normal   Right hip absent absent normal   Left hip absent absent normal   Right knee absent absent normal   Left knee absent absent normal   Right ankle absent absent normal   Left ankle absent absent normal   Right foot absent absent normal   Left foot absent absent normal         CONSULTS: Neurosurgery      Active Problems:    Fall from standing    Concussion    Cephalohematoma    Facial abrasion, initial encounter  Resolved Problems:    * No resolved hospital problems. *        Assessment/Plan:     Neuro: Subdural hematoma stable on repeat imaging. Frontal scalp hematoma in the left periorbital ecchymosis. GCS 15. Pain control. Keppra 7 days. Hold anticoag and antiplatelet medications  CV: No acute issues. Pulm: No acute issues. Encourage IS/SMI. GI: No acute issues. Bowel regimen. Zofran PRN. Renal: No acute issues. Endocrine: No acute issues. 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 PT/OT. Heme: No acute issues. ID: No acute issues.     Pain/Analgesia: Tylenol  Bowel Regimen: As needed GlycoLax   DVT PPx:  SCDs  GI PPx:  Pepcid     Code status:  Full Code    Disposition: Likely home today, patient has sister nearby who can assist    Wesly Mas, DO Resident, PGY-1  4/21/2021  7:03 AM

## 2021-04-21 NOTE — ED PROVIDER NOTES
--------------------------------------------- PAST HISTORY ---------------------------------------------  Past Medical History: MI, atrial fibrillation, diabetes    Past Surgical History:  has no past surgical history on file. Social History:      Family History: family history is not on file. The patients home medications have been reviewed.     Allergies: No known drug allergies    -------------------------------------------------- RESULTS -------------------------------------------------    LABS:  Results for orders placed or performed during the hospital encounter of 04/20/21   Comprehensive Metabolic Panel   Result Value Ref Range    Sodium 139 132 - 146 mmol/L    Potassium 4.8 3.5 - 5.0 mmol/L    Chloride 100 98 - 107 mmol/L    CO2 29 22 - 29 mmol/L    Anion Gap 10 7 - 16 mmol/L    Glucose 450 (H) 74 - 99 mg/dL    BUN 25 (H) 8 - 23 mg/dL    CREATININE 1.1 (H) 0.5 - 1.0 mg/dL    GFR Non-African American 48 >=60 mL/min/1.73    GFR African American 59     Calcium 9.0 8.6 - 10.2 mg/dL    Total Protein 6.4 6.4 - 8.3 g/dL    Albumin 3.1 (L) 3.5 - 5.2 g/dL    Total Bilirubin 0.2 0.0 - 1.2 mg/dL    Alkaline Phosphatase 137 (H) 35 - 104 U/L    ALT 34 (H) 0 - 32 U/L    AST 29 0 - 31 U/L   CBC Auto Differential   Result Value Ref Range    WBC 6.7 4.5 - 11.5 E9/L    RBC 3.72 3.50 - 5.50 E12/L    Hemoglobin 10.3 (L) 11.5 - 15.5 g/dL    Hematocrit 32.1 (L) 34.0 - 48.0 %    MCV 86.3 80.0 - 99.9 fL    MCH 27.7 26.0 - 35.0 pg    MCHC 32.1 32.0 - 34.5 %    RDW 13.2 11.5 - 15.0 fL    Platelets 045 322 - 394 E9/L    MPV 9.6 7.0 - 12.0 fL    Neutrophils % 67.0 43.0 - 80.0 %    Immature Granulocytes % 0.4 0.0 - 5.0 %    Lymphocytes % 23.7 20.0 - 42.0 %    Monocytes % 7.6 2.0 - 12.0 %    Eosinophils % 1.0 0.0 - 6.0 %    Basophils % 0.3 0.0 - 2.0 %    Neutrophils Absolute 4.49 1.80 - 7.30 E9/L    Immature Granulocytes # 0.03 E9/L    Lymphocytes Absolute 1.59 1.50 - 4.00 E9/L    Monocytes Absolute 0.51 0.10 - 0.95 E9/L Eosinophils Absolute 0.07 0.05 - 0.50 E9/L    Basophils Absolute 0.02 0.00 - 0.20 E9/L   APTT   Result Value Ref Range    aPTT 31.8 24.5 - 35.1 sec   Protime-INR   Result Value Ref Range    Protime 15.0 (H) 9.3 - 12.4 sec    INR 1.3    TEG lab test   Result Value Ref Range    R (Reaction Time) 4.8 (L) 5.0 - 10.0 min    K (Clotting Time) 0.8 (L) 1.0 - 3.0 min    Angle (Clot Strength) 78.6 (H) 59.0 - 74.0 degree    MA (Max Amplitude) 75.4 (H) 50.0 - 70.0 mm    G-TEG 15.3 (H) 4.5 - 11.0 K d/sc    EPL-TEG 0.8 0.0 - 15.0 %    LY30 (Fibrinolysis) 0.8 0.0 - 8.0 %   TYPE AND SCREEN   Result Value Ref Range    ABO/Rh A POS     Antibody Screen NEG        RADIOLOGY:  CT HEAD WO CONTRAST   Final Result   1. Known right frontal parafalcine subdural hematoma measures approximately 7   mm in depth with minimal local gyral effacement. No prior exam available   from outside facility. Short-term follow-up recommended. 2. No evidence of acute intracranial edema. 3. Hematoma overlies frontal calvarium. XR FEMUR LEFT (MIN 2 VIEWS)   Final Result   No fracture or dislocation of left hip. XR PELVIS (1-2 VIEWS)   Final Result   Linear lucency involving the left femoral neck. Findings worrisome for   fracture. Dedicated views of the left hip recommended if clinically   warranted. XR CHEST PORTABLE   Final Result   No acute process. XR HAND RIGHT (MIN 3 VIEWS)   Final Result   No acute osseous injury of the right hand is identified.               ------------------------- NURSING NOTES AND VITALS REVIEWED ---------------------------  Date / Time Roomed:  4/20/2021  5:30 PM  ED Bed Assignment:  [id number]    The nursing notes within the ED encounter and vital signs as below have been reviewed.      Patient Vitals for the past 24 hrs:   BP Temp Temp src Pulse Resp SpO2 Height Weight   04/20/21 2107 136/64 97.1 °F (36.2 °C) Temporal 92 18 98 %     04/20/21 1916 138/70 98 °F (36.7 °C)  95 17 96 %   04/20/21 1733 (!) 109/54 97.7 °F (36.5 °C) Infrared 101 20 94 % 5' 6\" (1.676 m) 145 lb (65.8 kg)       Oxygen Saturation Interpretation: Normal    ------------------------------------------ PROGRESS NOTES ------------------------------------------  Re-evaluation(s):  Time: 9784  Patients symptoms show no change  Repeat physical examination is not changed    Counseling:  I have spoken with the patient and discussed todays results, in addition to providing specific details for the plan of care and counseling regarding the diagnosis and prognosis. Their questions are answered at this time and they are agreeable with the plan of admission.    --------------------------------- ADDITIONAL PROVIDER NOTES ---------------------------------  Consultations:  Spoke with Trauma Surgery Team.  Discussed case. They will admit the patient. This patient's ED course included: a personal history and physicial examination, re-evaluation prior to disposition, cardiac monitoring and continuous pulse oximetry    This patient has remained hemodynamically stable during their ED course. Diagnosis:  1. Subdural hematoma ([address]    2. Fall from standing, initial encounter        Disposition:  Patient's disposition: Admit to telemetry  Patient's condition is stable.          Beto Quinn DO  Resident  04/21/21 1979

## 2021-04-21 NOTE — PROGRESS NOTES
awake, alert, following all commands, showing no strength deficits. Able to get out of bed without difficulty and standing without assist. Balance fair statically. Pt ambulating with walker, took 3-4 steps and began to freeze and stopped verbally responding -- dependently returned pt to sitting at bedside with subsequent return of alertness and talking. Pt reported being dizzy. BP obtained sitting and standing and listed above, + orthostatics. Informed RN. Pt returned to bed, unable to ambulate at this time d/t drop in pressure. Pt with all needs met and call light in reach. Pt would benefit from continued PT POC to address functional deficits described above. Treatment:  Patient practiced and was instructed in the following treatment:     Patient education provided continuously throughout session for sequencing, safety maintenance, and improving any deficits found during the evaluation.  Sitting EOB for >15 minutes for upright tolerance, postural awareness and BLE ROM    STS and pivot transfer training - pt educated on proper hand and foot placement, safety and sequencing, and use of WW to safely complete sit<>stand with hands on assistance to complete task safely. Sit to stand completed x 3 reps.  Gait training- pt was given verbal and tactile cues to facilitate improved use of walker and balance during ambulation as well as provided with physical assistance to complete task.  Education on heel pressure relief and management/safety with ortho hypotension. Pt's/ family goals   1. Return home     Patient and or family understand(s) diagnosis, prognosis, and plan of care.   Yes     PLAN:    Current Treatment Recommendations   [] Strengthening     [] ROM   [x] Balance Training   [x] Endurance Training   [x] Transfer Training   [x] Gait Training   [] Stair Training   [x] Positioning   [x] Safety and Education Training   [] Patient/Caregiver Education   [] HEP  [] Other     Frequency of treatments:

## 2021-04-21 NOTE — PROGRESS NOTES
Spoke with patient about her fall. Patient states that she fell in her apartment after getting up. Inquired if patient felt lightheaded after she was getting up and she said yes. She does not believe she blacked out before the fall. Earlier today while working with physical therapy patient did have an episode of orthostatic hypotension. BP when standing BP was 67/44, when lying blood pressure was 97/48. We will obtain EKG, echocardiogram, troponin and consult cardiology for syncopal fall work-up.

## 2021-04-22 LAB
ANION GAP SERPL CALCULATED.3IONS-SCNC: 9 MMOL/L (ref 7–16)
BASOPHILS ABSOLUTE: 0.02 E9/L (ref 0–0.2)
BASOPHILS RELATIVE PERCENT: 0.4 % (ref 0–2)
BUN BLDV-MCNC: 18 MG/DL (ref 6–23)
CALCIUM SERPL-MCNC: 8.5 MG/DL (ref 8.6–10.2)
CHLORIDE BLD-SCNC: 108 MMOL/L (ref 98–107)
CO2: 28 MMOL/L (ref 22–29)
CREAT SERPL-MCNC: 1 MG/DL (ref 0.5–1)
EKG ATRIAL RATE: 87 BPM
EKG P AXIS: 61 DEGREES
EKG P-R INTERVAL: 130 MS
EKG Q-T INTERVAL: 398 MS
EKG QRS DURATION: 114 MS
EKG QTC CALCULATION (BAZETT): 478 MS
EKG R AXIS: 0 DEGREES
EKG T AXIS: 23 DEGREES
EKG VENTRICULAR RATE: 87 BPM
EOSINOPHILS ABSOLUTE: 0.15 E9/L (ref 0.05–0.5)
EOSINOPHILS RELATIVE PERCENT: 2.6 % (ref 0–6)
GFR AFRICAN AMERICAN: >60
GFR NON-AFRICAN AMERICAN: 54 ML/MIN/1.73
GLUCOSE BLD-MCNC: 177 MG/DL (ref 74–99)
HCT VFR BLD CALC: 30.8 % (ref 34–48)
HEMOGLOBIN: 9.6 G/DL (ref 11.5–15.5)
IMMATURE GRANULOCYTES #: 0.01 E9/L
IMMATURE GRANULOCYTES %: 0.2 % (ref 0–5)
LYMPHOCYTES ABSOLUTE: 1.93 E9/L (ref 1.5–4)
LYMPHOCYTES RELATIVE PERCENT: 34 % (ref 20–42)
MCH RBC QN AUTO: 27.4 PG (ref 26–35)
MCHC RBC AUTO-ENTMCNC: 31.2 % (ref 32–34.5)
MCV RBC AUTO: 88 FL (ref 80–99.9)
METER GLUCOSE: 168 MG/DL (ref 74–99)
METER GLUCOSE: 202 MG/DL (ref 74–99)
METER GLUCOSE: 229 MG/DL (ref 74–99)
METER GLUCOSE: 276 MG/DL (ref 74–99)
MONOCYTES ABSOLUTE: 0.58 E9/L (ref 0.1–0.95)
MONOCYTES RELATIVE PERCENT: 10.2 % (ref 2–12)
NEUTROPHILS ABSOLUTE: 2.98 E9/L (ref 1.8–7.3)
NEUTROPHILS RELATIVE PERCENT: 52.6 % (ref 43–80)
PDW BLD-RTO: 13.5 FL (ref 11.5–15)
PLATELET # BLD: 265 E9/L (ref 130–450)
PMV BLD AUTO: 9.6 FL (ref 7–12)
POTASSIUM REFLEX MAGNESIUM: 4.4 MMOL/L (ref 3.5–5)
RBC # BLD: 3.5 E12/L (ref 3.5–5.5)
SODIUM BLD-SCNC: 145 MMOL/L (ref 132–146)
TROPONIN: 0.02 NG/ML (ref 0–0.03)
WBC # BLD: 5.7 E9/L (ref 4.5–11.5)

## 2021-04-22 PROCEDURE — 93010 ELECTROCARDIOGRAM REPORT: CPT | Performed by: INTERNAL MEDICINE

## 2021-04-22 PROCEDURE — 99223 1ST HOSP IP/OBS HIGH 75: CPT | Performed by: INTERNAL MEDICINE

## 2021-04-22 PROCEDURE — 2060000000 HC ICU INTERMEDIATE R&B

## 2021-04-22 PROCEDURE — 85025 COMPLETE CBC W/AUTO DIFF WBC: CPT

## 2021-04-22 PROCEDURE — 6370000000 HC RX 637 (ALT 250 FOR IP): Performed by: NURSE PRACTITIONER

## 2021-04-22 PROCEDURE — 36415 COLL VENOUS BLD VENIPUNCTURE: CPT

## 2021-04-22 PROCEDURE — 82962 GLUCOSE BLOOD TEST: CPT

## 2021-04-22 PROCEDURE — 6370000000 HC RX 637 (ALT 250 FOR IP): Performed by: STUDENT IN AN ORGANIZED HEALTH CARE EDUCATION/TRAINING PROGRAM

## 2021-04-22 PROCEDURE — 6370000000 HC RX 637 (ALT 250 FOR IP): Performed by: SURGERY

## 2021-04-22 PROCEDURE — APPSS60 APP SPLIT SHARED TIME 46-60 MINUTES: Performed by: PHYSICIAN ASSISTANT

## 2021-04-22 PROCEDURE — 80048 BASIC METABOLIC PNL TOTAL CA: CPT

## 2021-04-22 PROCEDURE — 84484 ASSAY OF TROPONIN QUANT: CPT

## 2021-04-22 PROCEDURE — 97530 THERAPEUTIC ACTIVITIES: CPT

## 2021-04-22 PROCEDURE — 99233 SBSQ HOSP IP/OBS HIGH 50: CPT | Performed by: SURGERY

## 2021-04-22 RX ORDER — SODIUM CHLORIDE 9 MG/ML
INJECTION, SOLUTION INTRAVENOUS CONTINUOUS
Status: DISCONTINUED | OUTPATIENT
Start: 2021-04-22 | End: 2021-04-23

## 2021-04-22 RX ORDER — LOPERAMIDE HYDROCHLORIDE 2 MG/1
2 CAPSULE ORAL 4 TIMES DAILY PRN
Status: DISCONTINUED | OUTPATIENT
Start: 2021-04-22 | End: 2021-04-26 | Stop reason: HOSPADM

## 2021-04-22 RX ADMIN — NEOMYCIN SULFATE, POLYMYXIN B SULFATE AND BACITRACIN ZINC: 3.5; 10000; 4 OINTMENT OPHTHALMIC at 06:29

## 2021-04-22 RX ADMIN — INSULIN LISPRO 3 UNITS: 100 INJECTION, SOLUTION INTRAVENOUS; SUBCUTANEOUS at 12:02

## 2021-04-22 RX ADMIN — ATORVASTATIN CALCIUM 40 MG: 40 TABLET, FILM COATED ORAL at 08:06

## 2021-04-22 RX ADMIN — INSULIN GLARGINE 22 UNITS: 100 INJECTION, SOLUTION SUBCUTANEOUS at 08:06

## 2021-04-22 RX ADMIN — LEVETIRACETAM 500 MG: 500 TABLET ORAL at 19:58

## 2021-04-22 RX ADMIN — NEOMYCIN SULFATE, POLYMYXIN B SULFATE AND BACITRACIN ZINC: 3.5; 10000; 4 OINTMENT OPHTHALMIC at 17:46

## 2021-04-22 RX ADMIN — BACITRACIN, NEOMYCIN, POLYMYXIN B 1 G: 400; 3.5; 5 OINTMENT TOPICAL at 19:58

## 2021-04-22 RX ADMIN — ACETAMINOPHEN 650 MG: 325 TABLET ORAL at 04:09

## 2021-04-22 RX ADMIN — INSULIN LISPRO 6 UNITS: 100 INJECTION, SOLUTION INTRAVENOUS; SUBCUTANEOUS at 08:07

## 2021-04-22 RX ADMIN — BACITRACIN, NEOMYCIN, POLYMYXIN B 1 G: 400; 3.5; 5 OINTMENT TOPICAL at 08:06

## 2021-04-22 RX ADMIN — INSULIN LISPRO 3 UNITS: 100 INJECTION, SOLUTION INTRAVENOUS; SUBCUTANEOUS at 19:58

## 2021-04-22 RX ADMIN — LEVETIRACETAM 500 MG: 500 TABLET ORAL at 08:06

## 2021-04-22 RX ADMIN — NEOMYCIN SULFATE, POLYMYXIN B SULFATE AND BACITRACIN ZINC: 3.5; 10000; 4 OINTMENT OPHTHALMIC at 12:05

## 2021-04-22 RX ADMIN — INSULIN LISPRO 9 UNITS: 100 INJECTION, SOLUTION INTRAVENOUS; SUBCUTANEOUS at 17:12

## 2021-04-22 RX ADMIN — LOPERAMIDE HYDROCHLORIDE 2 MG: 2 CAPSULE ORAL at 14:36

## 2021-04-22 ASSESSMENT — PAIN DESCRIPTION - PAIN TYPE: TYPE: ACUTE PAIN

## 2021-04-22 ASSESSMENT — PAIN DESCRIPTION - PROGRESSION: CLINICAL_PROGRESSION: GRADUALLY WORSENING

## 2021-04-22 ASSESSMENT — PAIN DESCRIPTION - FREQUENCY: FREQUENCY: INTERMITTENT

## 2021-04-22 ASSESSMENT — PAIN SCALES - GENERAL
PAINLEVEL_OUTOF10: 0

## 2021-04-22 ASSESSMENT — PAIN DESCRIPTION - ONSET: ONSET: GRADUAL

## 2021-04-22 ASSESSMENT — PAIN SCALES - WONG BAKER: WONGBAKER_NUMERICALRESPONSE: 0

## 2021-04-22 ASSESSMENT — PAIN - FUNCTIONAL ASSESSMENT: PAIN_FUNCTIONAL_ASSESSMENT: ACTIVITIES ARE NOT PREVENTED

## 2021-04-22 NOTE — CONSULTS
Inpatient Cardiology Consultation      Reason for Consult: Syncope with fall, orthostatic hypotension    Consulting Physician: Dr. Star Paulino    Requesting Physician: Dr. Maryse Daniels    Date of Consultation: 4/22/2021    HISTORY OF PRESENT ILLNESS:   Patient is a 77year old WF who is not previously known to [facility]. She is being seen in consultation is hospital admission by Dr. Star Paulino for evaluation and recommendations regarding orthostatic hypotension and questionable syncope with fall. Patient has a known past medical history of CVA per chart review, carotid artery disease, questionable peripheral arterial disease, hyperlipidemia, former tobacco abuse, insulin requiring diabetes mellitus type II, reported history of atrial fibrillation on chronic Eliquis therapy, diabetic wounds status-post right transmetatarsal amputation and reported coronary artery disease with remote PCI and stent placement x 2 ~March 2021 per the patient at [facility]. Per the patient, within the past several weeks, she has been evaluated by cardiology service at [facility] due to new onset atrial fibrillation. She states she was started on Eliquis therapy as a result of this. She then underwent further evaluation, and states she underwent left heart catheterization at [facility] within the last couple of weeks. She states she required stent placement and PCI x 2 (details unknown at this time as cath report unavailable for review). She was then placed on both aspirin and Plavix therapy, on top of her oral anticoagulation for her newly diagnosed atrial fibrillation. Patient states on April 20, 2021, she went to stand up from sitting down while in her home and suddenly lost consciousness and fell to the floor. Patient states she lives alone, and the episode was unwitnessed.   She denies any symptoms prior to the episode, including chest discomfort, shortness of breath, nausea, emesis, diaphoresis, dizziness, palpitations or near syncope. She denies any focal neurological deficits, including vision changes or aura-like symptoms prior to or after the episode. She states the event occurred suddenly without warning. She is unsure of the duration of her loss of consciousness. She has no recollection regarding further details regarding the syncopal episode. Once she came to, she called EMS and was taken to [facility] for further evaluation. She underwent head CT while in the ED, was found to have small right frontal subdural hematoma with frontal extracranial hematoma as well. She was transferred to [facility] same day for further evaluation by neurology service. Orthostatic vital signs were obtained on April 21, 2021: 97/48 supine --> 75/43 sitting --> 67/44 standing. She was given IV fluids, with repeat orthostatics noted resolution of hypotension. Of note, patient additionally admits to a similar episode of syncope back in December 2020. However, patient notes of standing up and walking several steps before she suddenly passed out. She states she was not given an etiology of her syncope after being formally evaluated at an outside facility at this time. She has had no further syncopal episode since hospital admission. She denies any other cardiac complaints at this time, and is currently asymptomatic from a cardiac perspective during time of interview/exam.  She denies any pertinent cardiac family medical history to me at this time. She is a former tobacco smoker, quit in February 2020. States she smoked one pack/day for only approximately three years in duration. Denies former or current alcohol or illicit drug use. Labs and diagnostic testing as noted below. Please note: past medical records were reviewed per electronic medical record (EMR) - see detailed reports under Past Medical/ Surgical History. PAST MEDICAL HISTORY:    1.  History of CVA with no reported residual deficits per the patient. 2. Carotid artery disease. CTA Head/Neck [facility], 01/2020 --> Result Impression: There is no acute intracranial hemorrhage or mass effect. Mild patchy low-attenuation in the bilateral periventricular white matter likely reflecting chronic small vessel ischemic disease. Small areas of focal low attenuation in the kenya likely representing small old lacunar infarctions. Calcified and noncalcified plaquing at both carotid bifurcations extending into the proximal internal carotid arteries. This results in an estimated 50-60% stenosis of the proximal right internal carotid artery 14 mm distal to its origin, and an estimated 60% stenosis of the origin of the left internal carotid artery. There are no major proximal branch vessel occlusions throughout the anterior or posterior intracranial circulations. If symptoms continue correlation with MRI would be recommended for follow-up.)  3. Questionable peripheral arterial disease. EDUARD [facility], 12/2020 --> Result Narrative: Lower Arterial: Mild right lower extremity arterial insufficiency: Circulation of the right leg is mildly diminished. Limb threat is  unlikely, but the findings are compatible with a clinical diagnosis of claudication. The right ankle-brachial index is 0.86. The right digital brachial index is 0.28. Normal left physiologic lower extremity arterial study: The patient has normal left lower extremity arterial circulation at rest. The findings exclude the possibility of limb threatening ischemia from large vessel occlusive disease. However, subtle arterial occlusive lesions  are not excluded. Exercise provocation may be necessary to demonstrate such lesions. If clinically indicated, the study could be repeated with treadmill provocation. The left ankle-brachial index is 1. 11. The left digital brachial index is 0.61.  Normal right leg arterial duplex: Our study shows normal duplex findings of the arteries of  the right leg, with no evidence of vascular injury or arterial stenosis. 4. Coronary artery disease status-post stent placement x 2 ~March 2021 at [facility] per the patient (details unknown at this time). 5. Hyperlipidemia, on statin therapy. 6. Questionable history of atrial fibrillation, on Eliquis therapy. 7. Diabetes mellitus type II, insulin requiring. 8. Former tobacco abuse. 9. Diabetic wounds status-post right transmetatarsal amputation (date unknown). CARDIAC TESTING    Echocardiogram [facility], 12/2020)  Result Narrative  · Left ventricle is of normal size. · There is concentric remodeling of the left ventricle. · The left ventricular systolic function is normal. The left ventricular   ejection fraction is 60 - 64% (normal LVEF is 55%-74%). · Left ventricular diastolic dysfunction is present, consistent with   pseudonormalization (Grade II). · There is normal left ventricular wall motion. · The right ventricle has normal size and systolic function. · No prior study available for comparison. PAST SURGICAL HISTORY:    History reviewed. No pertinent surgical history. HOME MEDICATIONS:  Prior to Admission medications    Medication Sig Start Date End Date Taking?  Authorizing Provider   aspirin 81 MG EC tablet Take 81 mg by mouth daily   Yes Historical Provider, MD   atorvastatin (LIPITOR) 40 MG tablet Take 40 mg by mouth daily   Yes Historical Provider, MD   clopidogrel (PLAVIX) 75 MG tablet Take 75 mg by mouth 2 times daily   Yes Historical Provider, MD   apixaban (ELIQUIS) 5 MG TABS tablet Take 5 mg by mouth 2 times daily   Yes Historical Provider, MD   Ascorbic Acid (VITAMIN C) 1000 MG tablet Take 1,000 mg by mouth daily   Yes Historical Provider, MD   insulin glargine (LANTUS) 100 UNIT/ML injection vial Inject 22 Units into the skin every morning   Yes Historical Provider, MD   insulin glargine (LANTUS) 100 UNIT/ML injection vial Inject into the skin nightly Patient tablet 500 mg, 500 mg, Oral, BID, Libertad Nogueira MD, 500 mg at 04/22/21 0806    insulin lispro (HUMALOG) injection vial 0-18 Units, 0-18 Units, Subcutaneous, TID WC, Libertad Nogueira MD, 6 Units at 04/22/21 0807    insulin lispro (HUMALOG) injection vial 0-9 Units, 0-9 Units, Subcutaneous, Nightly, Libertad Nogueira MD, 7 Units at 04/21/21 2301    glucose (GLUTOSE) 40 % oral gel 15 g, 15 g, Oral, PRN, Libertad Nogueira MD    dextrose 50 % IV solution, 12.5 g, Intravenous, PRN, Libertad Nogueira MD    glucagon (rDNA) injection 1 mg, 1 mg, Intramuscular, PRN, Libertad Nogueira MD    dextrose 5 % solution, 100 mL/hr, Intravenous, PRN, Libertad Nogueira MD      ALLERGIES:  Patient has no known allergies. SOCIAL HISTORY:    She is a former tobacco smoker, quit in February 2020. States she smoked one pack/day for only approximately three years in duration. Denies former or current alcohol or illicit drug use. FAMILY HISTORY:   She denies any pertinent cardiac family medical history to me at this time. REVIEW OF SYSTEMS:     · Constitutional: Denies fevers, chills, night sweats, and generalized fatigue. Denies significant weight loss or weight gain. · HEENT: Denies headaches, nose bleeds, rhinorrhea, sore throat. Denies blurred vision. Denies dysphagia, odynophagia. · Musculoskeletal: Denies falls, pain to BLE with ambulation. Denies muscle weakness. · Neurological: Denies dizziness and lightheadedness, numbness and tingling. Denies focal neurological deficits. · Cardiovascular: +syncope. Denies chest pain, palpitations, diaphoresis. Denies near syncope. Denies PND, orthopnea, peripheral edema. · Respiratory: Denies shortness of breath at rest or with exertion. Denies cough, hemoptysis. · Gastrointestinal: Denies abdominal pain, nausea/vomiting, diarrhea and constipation, black/bloody, and tarry stools. · Genitourinary: Denies dysuria and hematuria.   · Hematologic: Denies excessive bruising or bleeding. · Endocrine: Denies excessive thirst. Denies intolerance to hot and cold. PHYSICAL EXAM:   BP (!) 149/50   Pulse 85   Temp 97.1 °F (36.2 °C) (Temporal)   Resp 18   Ht 5' 6\" (1.676 m)   Wt 145 lb (65.8 kg)   SpO2 98%   BMI 23.40 kg/m²   CONST:  Well developed, well nourished WF who appears stated age. Awake, alert, cooperative, no apparent distress. HEENT:   Eyes- +ecchymosis surrounding both eyes. Neck-  No stridor, trachea midline, no apparent jugular venous distention. CHEST: Chest symmetrical and non-tender to palpation. No accessory muscle use or intercostal retractions. RESPIRATORY: Lung sounds - clear throughout fields. No wheezing, rales or rhonchi. CARDIOVASCULAR:     Heart Inspection- shows no noted pulsations. Heart Ausculation- Regular rate and rhythm, no murmur. No s3, s4 or rub. PV: +right transmetatarsal amputation. No lower extremity edema. Pedal pulses palpable, no clubbing or cyanosis. ABDOMEN: Soft, non-tender to light palpation. Bowel sounds present. MS: Good muscle strength and tone. No atrophy or abnormal movements. SKIN: Warm and dry. NEURO / PSYCH: Oriented to person, place and time. Speech clear and appropriate. Follows all commands. DATA:    Telemetry: NSR with HR in the 80s    Diagnostic:  All diagnostic testing and lab work thus far this admission reviewed in detail. Right Hand XRAY 4/20/2021:  Impression  No acute osseous injury of the right hand is identified. CXR 4/20/2021:  Impression  No acute process. Pelvis XRAY 4/20/2021:  Impression  Linear lucency involving the left femoral neck.  Findings worrisome for  fracture.  Dedicated views of the left hip recommended if clinically  warranted. Left Femur XRAY 4/20/2021:  Impression  No fracture or dislocation of left hip. CT Head 4/20/2021:  Impression  1. Known right frontal parafalcine subdural hematoma measures approximately 7  mm in depth with minimal local gyral effacement.  No prior exam available  from outside facility.  Short-term follow-up recommended. 2. No evidence of acute intracranial edema. 3. Hematoma overlies frontal calvarium. Intake/Output Summary (Last 24 hours) at 4/22/2021 1038  Last data filed at 4/22/2021 0350  Gross per 24 hour   Intake 540 ml   Output    Net 540 ml       Labs:   CBC:   Recent Labs     04/21/21  0755 04/22/21  0602   WBC 7.9 5.7   HGB 11.1* 9.6*   HCT 35.3 30.8*    265     BMP:   Recent Labs     04/21/21  0755 04/22/21  0602    145   K 4.0 4.4   CO2 31* 28   BUN 21 18   CREATININE 1.0 1.0   LABGLOM 54 54   CALCIUM 9.2 8.5*     PT/INR:   Recent Labs     04/20/21  1819   PROTIME 15.0*   INR 1.3     APTT:  Recent Labs     04/20/21  1819   APTT 31.8     CARDIAC ENZYMES:  Recent Labs     04/21/21  1807 04/22/21  0602   TROPONINI 0.03 0.02     LIVER PROFILE:  Recent Labs     04/20/21  1819   AST 29   ALT 34*   LABALBU 3.1*       -- Monitor on telemetry  -- Consider outpatient event monitor  -- Continue IVFs  -- Compression stockings  -- Obtain records from [facility]   -- Re-start Aspirin and Plavix therapy as soon as possible when okay from neurology standpoint given recent coronary stents/PCI per the patient. -- Further recommendations to follow. Assessment and plan to follow as per Dr. Kg Schumacher.     [facility], [address] [place] [address], [place] [address] Cardiology    Electronically signed by Martha Looney PA-C on 4/22/2021 at 10:38 AM

## 2021-04-22 NOTE — PLAN OF CARE
Problem: Pain:  Goal: Pain level will decrease  Outcome: Met This Shift  Goal: Control of acute pain  Outcome: Met This Shift     Problem: Falls - Risk of:  Goal: Will remain free from falls  Outcome: Met This Shift  Goal: Absence of physical injury  Outcome: Met This Shift

## 2021-04-22 NOTE — CARE COORDINATION
Met with pt at the bedside. Discussed with pt that cardiology was consulted and echo was ordered. Per RN ortho's negative today. Dressing change to foot not new and her sister, who is an RN changes it for her. Plan remains home with no needs at discharge.

## 2021-04-22 NOTE — PROGRESS NOTES
Physical Therapy  Physical Therapy Treatment Note    Name: Ayde Henson  : 1945  MRN: [id number]    Referring Provider:  Butch Cruz MD    Date of Service: 2021    Evaluating PT:  Mesha Sykes PT, DPT. [id number]    Room #:  8502/8502-B  Diagnosis:  Fall from standing   Reason for admission:  Fall, ICH   Precautions:  Falls, orthostatic hypotension, hx R TMA, R heel wound  Procedures: none  Equipment Recommendations:  Return to [employer] or rollator    SUBJECTIVE:  Pt lives alone in a 7th floor aprtment with no steps and elevator acces. Ambulating with [employer] or rollator. OBJECTIVE:   Initial Evaluation  Date:  Treatment Date: 21   Short Term/ Long Term   Goals   AM-PAC 6 Clicks 71/07 45/54    Was pt agreeable to Eval/treatment? Yes  Yes    Does pt have pain? Denies  No current complaints of pain    Bed Mobility  Rolling: NT  Supine to sit: independent  Sit to supine: independent  Scooting: independent Rolling: NT  Supine to sit: Supervision  Sit to supine: Supervision  Scooting: Supervision to EOB Independent    Transfers Sit to stand: SBA  Stand to sit: SBA  Stand pivot: NT Sit to stand: SBA  Stand to sit: SBA  Stand pivot: SBA with [employer] Independent    Ambulation    3 feet with [employer] Alyse  -pt went unresponsive while standing. (+) orthostatics. BP's listed below. 100 feet x2 with [employer] with SBA >150 feet with [employer] Mod I    Stair negotiation: ascended and descended  NT NT TBD   ROM BUE:  See OT eval   BLE:  WFL NT    Strength BUE:  See OT eval   BLE:  knee ext 5/5  Ankle DF 5/5 NT Increase by 1/3 MMT grade    Balance Sitting EOB:  Independent  Dynamic Standing:  Alyse Sitting EOB:  Independent  Dynamic Standing: SBA with [employer] Sitting EOB:  indep  Dynamic Standing: Mod I      Pt is A & O x: 4 to person, place month/year, and situation. Sensation: Pt denies numbness and tingling of extremities. Edema: Unremarkable.     Therapeutic Exercises:   5x STS from EOB with SBA  Ankle pumps x20 reps  LAQ x10 reps Seated marches x10 reps    Patient education  Pt educated safety with all mobility    Patient response to education:   Pt verbalized understanding Pt demonstrated skill Pt requires further education in this area   Yes NA No     ASSESSMENT:    Comments:    Pt was in bed upon room entry, agreeable to PT treatment. RN reports orthostatics were negative earlier this morning. Pt denied dizziness throughout session. Pt ambulates with slow gait speed with fair steadiness. Pt attempts to ambulate on R midfoot due to heel wound. Pt ambulated to end of hallway and back to room. Pt completed all therapeutic exercises noted above with rest breaks in between sets. Pt inquired about tennis ball glides on [employer]; pt was informed glides should be available at [employer]. Pt was assisted back to bed following all activity. Pt was left in bed with all needs met at conclusion of session. Treatment:  Patient practiced and was instructed in the following treatment:     Therapeutic activities: Pt completed all therapeutic activities noted above. Symptoms were monitored closely throughout session and pt was instructed to take rest breaks following all transfers. Pt was educated extensively on fall prevention. Pt was cued for hand placement during sit <> stand transfers. Pt completed x7 transfers from EOB. Pt ambulated extended distance x2 reps with WW as standing balance, strength, and endurance were challenged.  Therapeutic exercises: Pt completed all therapeutic exercises noted above. Pt was cued for technique, reps, and sets. PLAN:    Patient is making good progress towards established goals. Will continue with current POC.       Time in: 1005  Time out: 1030    Total Treatment Time 25 minutes     CPT codes:  [] Gait training 00859 0 minutes  [] Manual therapy 02369 0 minutes  [x] Therapeutic activities 71025 25 minutes  [] Therapeutic exercises 52464 0 minutes  [] Neuromuscular reeducation 91299 0 minutes      [facility]

## 2021-04-22 NOTE — PROGRESS NOTES
CHRIST SILVA notified of [facility] cardio consult per faby.   Pt added to census

## 2021-04-22 NOTE — PROGRESS NOTES
present. Mental Status: She is alert. Assessment   Active Problems:    Fall from standing    Concussion    Cephalohematoma    Facial abrasion, initial encounter  Resolved Problems:    * No resolved hospital problems. *      Plan   GI: Regular Diet , Senakot  Dulcolax   Neuro: prn Tylenol   NS following for SDH 7mm and stable from [facility] to here. ( no disc from [place] )   Renal: Hep lock IV, Monitor Urine Output, Daily CBC and BMP   Musculoskeletal: WBAT all extremities , Spines Clear AM-PAC Score 18/24 Chronic right foot wound present prior to arrival   Pulmonary: Aggressive pulmonary hygiene , SMI , Monitor RR and Maintain SpO2 > 92%  ID: No Indication for Antibiotics      Heme: No indication for Transfusion   Cardiac: Monitor Hemodynamics Orthostatic hypotension during which revealed that this happened prior to her fall as well.  Cardiology consult- orthostatic hypotension  Hold ASA, Plavix and eliquis t home lipator   Endocrine:  Lantus and SSI     DVT Prophylaxis: PCDs, Hold Chemoprophylaxis until  48 hours after stable head CT    Ulcer Prophylaxis: No Ulcer Prophylaxis Indicated   Tubes and Lines: Continue PIV   Seizure proph:     Keppra  Ancillary consults:   Neurosurgery and PT/OT    Family Update:         As available   CODE Status:       Full Code    Dispo: Telemetry    Saul Roblero MD

## 2021-04-23 LAB
METER GLUCOSE: 216 MG/DL (ref 74–99)
METER GLUCOSE: 235 MG/DL (ref 74–99)
METER GLUCOSE: 296 MG/DL (ref 74–99)
METER GLUCOSE: 86 MG/DL (ref 74–99)

## 2021-04-23 PROCEDURE — 6370000000 HC RX 637 (ALT 250 FOR IP): Performed by: STUDENT IN AN ORGANIZED HEALTH CARE EDUCATION/TRAINING PROGRAM

## 2021-04-23 PROCEDURE — 2060000000 HC ICU INTERMEDIATE R&B

## 2021-04-23 PROCEDURE — 6370000000 HC RX 637 (ALT 250 FOR IP): Performed by: SURGERY

## 2021-04-23 PROCEDURE — 6360000002 HC RX W HCPCS: Performed by: STUDENT IN AN ORGANIZED HEALTH CARE EDUCATION/TRAINING PROGRAM

## 2021-04-23 PROCEDURE — 2580000003 HC RX 258: Performed by: STUDENT IN AN ORGANIZED HEALTH CARE EDUCATION/TRAINING PROGRAM

## 2021-04-23 PROCEDURE — 97530 THERAPEUTIC ACTIVITIES: CPT

## 2021-04-23 PROCEDURE — 99233 SBSQ HOSP IP/OBS HIGH 50: CPT | Performed by: SURGERY

## 2021-04-23 PROCEDURE — 99232 SBSQ HOSP IP/OBS MODERATE 35: CPT | Performed by: INTERNAL MEDICINE

## 2021-04-23 PROCEDURE — 82962 GLUCOSE BLOOD TEST: CPT

## 2021-04-23 RX ORDER — NEOMYCIN SULFATE, POLYMYXIN B SULFATE AND BACITRACIN ZINC 3.5; 10000; 4 MG/G; [USP'U]/G; [USP'U]/G
OINTMENT OPHTHALMIC
Qty: 1 TUBE | Refills: 0 | Status: SHIPPED | OUTPATIENT
Start: 2021-04-23 | End: 2021-05-03

## 2021-04-23 RX ORDER — BACITRACIN, NEOMYCIN, POLYMYXIN B 400; 3.5; 5 [USP'U]/G; MG/G; [USP'U]/G
OINTMENT TOPICAL
Qty: 1 TUBE | Refills: 2 | Status: SHIPPED | OUTPATIENT
Start: 2021-04-24 | End: 2021-05-03

## 2021-04-23 RX ORDER — LANOLIN ALCOHOL/MO/W.PET/CERES
6 CREAM (GRAM) TOPICAL NIGHTLY PRN
Qty: 60 TABLET | Refills: 3 | Status: SHIPPED | OUTPATIENT
Start: 2021-04-23

## 2021-04-23 RX ORDER — SENNA AND DOCUSATE SODIUM 50; 8.6 MG/1; MG/1
2 TABLET, FILM COATED ORAL 2 TIMES DAILY
Qty: 60 TABLET | Refills: 0 | Status: SHIPPED | OUTPATIENT
Start: 2021-04-23

## 2021-04-23 RX ORDER — LOPERAMIDE HYDROCHLORIDE 2 MG/1
2 CAPSULE ORAL 4 TIMES DAILY PRN
Qty: 40 CAPSULE | Refills: 0 | Status: SHIPPED | OUTPATIENT
Start: 2021-04-23 | End: 2021-05-03

## 2021-04-23 RX ORDER — CLOPIDOGREL BISULFATE 75 MG/1
75 TABLET ORAL 2 TIMES DAILY
Status: DISCONTINUED | OUTPATIENT
Start: 2021-04-23 | End: 2021-04-25

## 2021-04-23 RX ORDER — LANOLIN ALCOHOL/MO/W.PET/CERES
6 CREAM (GRAM) TOPICAL NIGHTLY
Status: DISCONTINUED | OUTPATIENT
Start: 2021-04-23 | End: 2021-04-26 | Stop reason: HOSPADM

## 2021-04-23 RX ORDER — ASPIRIN 81 MG/1
81 TABLET ORAL DAILY
Status: DISCONTINUED | OUTPATIENT
Start: 2021-04-23 | End: 2021-04-26 | Stop reason: HOSPADM

## 2021-04-23 RX ORDER — LEVETIRACETAM 500 MG/1
500 TABLET ORAL 2 TIMES DAILY
Qty: 8 TABLET | Refills: 0 | Status: SHIPPED | OUTPATIENT
Start: 2021-04-23 | End: 2021-04-23

## 2021-04-23 RX ORDER — LEVETIRACETAM 500 MG/1
500 TABLET ORAL 2 TIMES DAILY
Qty: 8 TABLET | Refills: 0 | Status: SHIPPED | OUTPATIENT
Start: 2021-04-23 | End: 2021-04-27

## 2021-04-23 RX ORDER — INSULIN GLARGINE 100 [IU]/ML
30 INJECTION, SOLUTION SUBCUTANEOUS EVERY MORNING
Status: DISCONTINUED | OUTPATIENT
Start: 2021-04-24 | End: 2021-04-25

## 2021-04-23 RX ORDER — HEPARIN SODIUM 10000 [USP'U]/ML
5000 INJECTION, SOLUTION INTRAVENOUS; SUBCUTANEOUS EVERY 8 HOURS SCHEDULED
Status: DISCONTINUED | OUTPATIENT
Start: 2021-04-23 | End: 2021-04-24

## 2021-04-23 RX ADMIN — INSULIN GLARGINE 22 UNITS: 100 INJECTION, SOLUTION SUBCUTANEOUS at 09:55

## 2021-04-23 RX ADMIN — INSULIN LISPRO 6 UNITS: 100 INJECTION, SOLUTION INTRAVENOUS; SUBCUTANEOUS at 09:55

## 2021-04-23 RX ADMIN — ATORVASTATIN CALCIUM 40 MG: 40 TABLET, FILM COATED ORAL at 09:54

## 2021-04-23 RX ADMIN — DOCUSATE SODIUM 50 MG AND SENNOSIDES 8.6 MG 2 TABLET: 8.6; 5 TABLET, FILM COATED ORAL at 09:54

## 2021-04-23 RX ADMIN — NEOMYCIN SULFATE, POLYMYXIN B SULFATE AND BACITRACIN ZINC: 3.5; 10000; 4 OINTMENT OPHTHALMIC at 18:45

## 2021-04-23 RX ADMIN — BISACODYL 10 MG: 5 TABLET, COATED ORAL at 09:55

## 2021-04-23 RX ADMIN — NEOMYCIN SULFATE, POLYMYXIN B SULFATE AND BACITRACIN ZINC: 3.5; 10000; 4 OINTMENT OPHTHALMIC at 05:41

## 2021-04-23 RX ADMIN — INSULIN LISPRO 9 UNITS: 100 INJECTION, SOLUTION INTRAVENOUS; SUBCUTANEOUS at 12:11

## 2021-04-23 RX ADMIN — ACETAMINOPHEN 650 MG: 325 TABLET ORAL at 14:33

## 2021-04-23 RX ADMIN — NEOMYCIN SULFATE, POLYMYXIN B SULFATE AND BACITRACIN ZINC: 3.5; 10000; 4 OINTMENT OPHTHALMIC at 12:10

## 2021-04-23 RX ADMIN — BACITRACIN, NEOMYCIN, POLYMYXIN B 1 G: 400; 3.5; 5 OINTMENT TOPICAL at 09:54

## 2021-04-23 RX ADMIN — LEVETIRACETAM 500 MG: 500 TABLET ORAL at 20:42

## 2021-04-23 RX ADMIN — HEPARIN SODIUM 5000 UNITS: 10000 INJECTION INTRAVENOUS; SUBCUTANEOUS at 23:04

## 2021-04-23 RX ADMIN — Medication 6 MG: at 23:04

## 2021-04-23 RX ADMIN — SODIUM CHLORIDE: 9 INJECTION, SOLUTION INTRAVENOUS at 03:39

## 2021-04-23 RX ADMIN — INSULIN LISPRO 3 UNITS: 100 INJECTION, SOLUTION INTRAVENOUS; SUBCUTANEOUS at 20:42

## 2021-04-23 RX ADMIN — HEPARIN SODIUM 5000 UNITS: 10000 INJECTION INTRAVENOUS; SUBCUTANEOUS at 14:34

## 2021-04-23 RX ADMIN — LEVETIRACETAM 500 MG: 500 TABLET ORAL at 09:54

## 2021-04-23 RX ADMIN — ASPIRIN 81 MG: 81 TABLET, COATED ORAL at 14:32

## 2021-04-23 RX ADMIN — Medication 6 MG: at 00:36

## 2021-04-23 RX ADMIN — ACETAMINOPHEN 650 MG: 325 TABLET ORAL at 00:25

## 2021-04-23 RX ADMIN — CLOPIDOGREL 75 MG: 75 TABLET, FILM COATED ORAL at 14:33

## 2021-04-23 RX ADMIN — NEOMYCIN SULFATE, POLYMYXIN B SULFATE AND BACITRACIN ZINC: 3.5; 10000; 4 OINTMENT OPHTHALMIC at 00:44

## 2021-04-23 RX ADMIN — CLOPIDOGREL 75 MG: 75 TABLET, FILM COATED ORAL at 20:42

## 2021-04-23 RX ADMIN — Medication 10 ML: at 20:42

## 2021-04-23 ASSESSMENT — PAIN SCALES - GENERAL
PAINLEVEL_OUTOF10: 4
PAINLEVEL_OUTOF10: 0

## 2021-04-23 ASSESSMENT — PAIN DESCRIPTION - FREQUENCY: FREQUENCY: INTERMITTENT

## 2021-04-23 ASSESSMENT — PAIN DESCRIPTION - DESCRIPTORS: DESCRIPTORS: HEADACHE

## 2021-04-23 NOTE — PROGRESS NOTES
Physical Therapy  Physical Therapy Treatment Note    Name: Alek Godoy  : 1945  MRN: [id number]    Referring Provider:  Darcy Chen MD    Date of Service: 2021    Evaluating PT:  Mitzi Neri PT, DPT. [id number]    Room #:  7711/4629-A  Diagnosis:  Fall from standing   Reason for admission:  Fall, ICH   Precautions:  Falls, orthostatic hypotension, hx R TMA, R heel wound  Procedures: none  Equipment Recommendations:  Return to [employer] or rollator    SUBJECTIVE:  Pt lives alone in a 7th floor aprtment with no steps and elevator acces. Ambulating with [employer] or rollator. OBJECTIVE:   Initial Evaluation  Date:  Treatment Date: 21   Short Term/ Long Term   Goals   AM-PAC 6 Clicks /32    Was pt agreeable to Eval/treatment? Yes  Yes    Does pt have pain? Denies  No current complaints of pain    Bed Mobility  Rolling: NT  Supine to sit: independent  Sit to supine: independent  Scooting: independent Rolling: NT  Supine to sit: Supervision  Sit to supine: Supervision  Scooting: Supervision to EOB Independent    Transfers Sit to stand: SBA  Stand to sit: SBA  Stand pivot: NT Sit to stand: SBA  Stand to sit: SBA  Stand pivot: SBA with [employer] Independent    Ambulation    3 feet with [employer] Alyse  -pt went unresponsive while standing. (+) orthostatics. BP's listed below. 35 feet , 100 feet   5 feet   ww sba   Cues - posture  Walker approx >150 feet with [employer] Mod I    Stair negotiation: ascended and descended  NT NT TBD   ROM BUE:  See OT eval   BLE:  WFL NT    Strength BUE:  See OT eval   BLE:  knee ext 5/5  Ankle DF 5/5 NT Increase by 1/3 MMT grade    Balance Sitting EOB:  Independent  Dynamic Standing:  Alyse Sitting EOB:  Independent  Dynamic Standing: SBA with [employer] Sitting EOB:  indep  Dynamic Standing:   Mod I      Pt is A & O x: 4     Therapeutic Exercises:   Laq, ankle pumps, hip flex       Patient education  Pt educated safety with all mobility, walker approx, upright posture    Patient response to education:   Pt verbalized understanding Pt demonstrated skill Pt requires further education in this area   Yes NA No     ASSESSMENT:    Comments:    Pt was attempting to get oob upon arrival.  Assisted pt to bathroom. Pt ambulated 35 feet and 100 feet ww  Sba. Cues for head downward gaze/ flexed posture and cues for walker approximation. With ambulation pt walked approx 75 feet and reports her hand feel cold and she feels \"weakness in her head\" and pt co headache. Checked BP after pt sitting briefly and her BP  114/46 . Pt sat and performed le ex and bp was 98/65, checked again was 99/42 and returned to supine 131/52 and 139/62. Educated pt on calling staff before getting oob for her safety. Pt denied dizziness throughout session. Pt ambulates with flexed posture, poor walker approx. Pt wearing a cast shoe and croc type shoe on her L LE. Pt resting in bed after rx. Pt was left in bed with all needs met at conclusion of session. Nursing notified of pt BP and pt wanting tylenol. Treatment:  Patient practiced and was instructed in the following treatment:     Therapeutic activities:    Pt education   Gait - cues for walker approx/posture   Bed mobility       Therapeutic exercises: Pt completed all therapeutic exercises noted above. Pt was cued for technique, reps, and sets. PLAN:    Patient is making good progress towards established goals. Will continue with current POC.       Time in: 145  Time out: 226      Total Treatment Time 41 minutes     CPT codes:  [] Gait training 67916 0 minutes  [] Manual therapy 54045 0 minutes  [x] Therapeutic activities 70368  41 minutes  [] Therapeutic exercises 95848 0 minutes  [] Neuromuscular reeducation 82665 0 minutes      Maurice Denis, [address]

## 2021-04-23 NOTE — PLAN OF CARE
Problem: Falls - Risk of:  Goal: Will remain free from falls  Description: Will remain free from falls  Outcome: Met This Shift  Goal: Absence of physical injury  Description: Absence of physical injury  Outcome: Met This Shift     Problem: Musculor/Skeletal Functional Status  Goal: Absence of falls  Outcome: Met This Shift     Problem: Skin Integrity:  Goal: Will show no infection signs and symptoms  Description: Will show no infection signs and symptoms  Outcome: Met This Shift  Goal: Absence of new skin breakdown  Description: Absence of new skin breakdown  Outcome: Met This Shift

## 2021-04-23 NOTE — PROGRESS NOTES
[facility]  SURGICAL INTENSIVE CARE UNIT (SICU)  ATTENDING PHYSICIAN CRITICAL CARE PROGRESS NOTE     I have examined the patient,reviewed the record, and discussed the case with the APN/  Resident. I have reviewed all relevant labs and imaging data. The following summarizes my clinical findings and independent assessment. Date of admission:  4/20/2021    CC: Fall out of her car     HOSPITAL COURSE:     4/20: Patient was a trauma transfer from [facility] after falling out of her car and hitting her head. Patient sustained subdural hematoma measuring 7 mm on radiology read from [facility] however [facility] did not provide disc for imaging. Repeat head CT here in [place] also demonstrating 7 mm subdural hematoma, which is stable. GCS 15 on presentation patient is on Eliquis for A. fib and aspirin Plavix for history of stents which have been held  4/21: She has 15 this morning. Patient endorsing headache. No new findings on tertiary exam.   4/22: After speaking with patient yesterday evening it was revealed she got lightheaded after going from seated to standing position before the fall. Trop 0.03, EKG demonstrating RBBB, Echo pending. Consulted cardiology  4/22: After speaking with patient yesterday evening it was revealed she got lightheaded after going from seated to standing position before the fall. Trop 0.03, EKG demonstrating RBBB,  Consulted cardiology  4/23 Patient doing well. Tolerating a diet. Wants to go home      Physical Exam:  Physical Exam  HENT:      Head: Normocephalic. Comments: Frontal cephalohematoma and abrasion   Eyes:      Extraocular Movements: Extraocular movements intact. Pupils: Pupils are equal, round, and reactive to light. Neck:      Musculoskeletal: Neck supple. Cardiovascular:      Rate and Rhythm: Normal rate. Pulmonary:      Effort: Pulmonary effort is normal. No respiratory distress. Abdominal:      General: Abdomen is flat.  There is no distension. Tenderness: There is no abdominal tenderness. Musculoskeletal: Normal range of motion. Comments: No midline back pain , Chronic right foot would present prior to arrival    Skin:     General: Skin is warm. Neurological:      General: No focal deficit present. Mental Status: She is alert. Assessment   Active Problems:    Fall from standing    Concussion    Cephalohematoma    Facial abrasion, initial encounter    Subdural hematoma ([address]  Resolved Problems:    * No resolved hospital problems. *      Plan   GI: Regular Diet , Senakot  Dulcolax   Neuro: prn Tylenol   NS following for SDH 7mm and stable from [facility] to here. ( no disc from [place] ) Will discuss ASA and PLavix with NS as patient had drug eluting stents this month. Renal: Hep lock IV, Monitor Urine Output, Daily CBC and BMP   Musculoskeletal: WBAT all extremities , Spines Clear AM-PAC Score 18/24 Chronic right foot wound present prior to arrival - Compression stocking LLE   Pulmonary: Aggressive pulmonary hygiene , SMI , Monitor RR and Maintain SpO2 > 92%  ID: No Indication for Antibiotics      Heme: No indication for Transfusion   Cardiac: Monitor Hemodynamics Orthostatic hypotension during which revealed that this happened prior to her fall as well.  Cardiology consulted- appreciate recommendations- orthostatic hypotension  Restart ASA, Plavix Hold eliquis discussed with NS   Endocrine:  Lantus ( increased from home dose 30U) and SSI     DVT Prophylaxis: PCDs, Start Heparin   Ulcer Prophylaxis: No Ulcer Prophylaxis Indicated   Tubes and Lines: Continue PIV   Seizure proph:     Keppra  Ancillary consults:   Neurosurgery and PT/OT    Family Update:         As available   CODE Status:       Full Code    Dispo: Telemetry    Rene Ybarra MD

## 2021-04-23 NOTE — CARE COORDINATION
Spoke with Dr Vincent Nunes Re: resumption of ASA and Plavix. Pt will need video visit follow up in his office in 2-3 weeks. Will evaluate resumption of anticoags at that time, looking for resolution of facial bruising and SDH prior to restarting. Message sent to cardio to check discharge. Yuliya Jay for discharge from cardiology. Message sent to trauma to check discharge.

## 2021-04-23 NOTE — PROGRESS NOTES
INPATIENT CARDIOLOGY FOLLOW-UP    Name: Art Bran    Age: 77 y.o. Date of Admission: 4/20/2021  5:30 PM    Date of Service: 4/23/2021    Chief Complaint: Follow-up for Syncope with fall, orthostatic hypotension       Interim History:  No new overnight cardiac complaints. Currently with no complaints of CP, SOB, palpitations, dizziness, or lightheadedness. SR on telemetry.     Review of Systems:   Cardiac: As per HPI  General: No fever, chills  Pulmonary: As per HPI  HEENT: No visual disturbances, difficult swallowing  GI: No nausea, vomiting  Endocrine: No thyroid disease or DM  Musculoskeletal: CARMEN x 4, no focal motor deficits  Skin: Intact, no rashes  Neuro/Psych: No headache or seizures    Problem List:  Patient Active Problem List   Diagnosis    Fall from standing    Concussion    Cephalohematoma    Facial abrasion, initial encounter    Subdural hematoma (HCC)       Allergies:  No Known Allergies    Current Medications:  Current Facility-Administered Medications   Medication Dose Route Frequency Provider Last Rate Last Admin    melatonin tablet 6 mg  6 mg Oral Nightly Mia Isaac MD   6 mg at 04/23/21 0036    aspirin EC tablet 81 mg  81 mg Oral Daily Blaise Billy DO        heparin (porcine) injection 5,000 Units  5,000 Units Subcutaneous 3 times per day Marlena Billy DO        clopidogrel (PLAVIX) tablet 75 mg  75 mg Oral BID Blaise Billy DO        loperamide (IMODIUM) capsule 2 mg  2 mg Oral 4x Daily PRN Fanny Boeck, APRN - CNP   2 mg at 04/22/21 1436    0.9 % sodium chloride infusion   Intravenous Continuous Blaise Billy  mL/hr at 04/23/21 0339 New Bag at 04/23/21 0339    acetaminophen (TYLENOL) tablet 650 mg  650 mg Oral Q4H PRN Rene Ybarra MD   650 mg at 04/23/21 0025    atorvastatin (LIPITOR) tablet 40 mg  40 mg Oral Daily Rene Ybarra MD   40 mg at 04/23/21 0954    insulin glargine (LANTUS) injection vial 22 Units  22 Units Subcutaneous Physical Exam:  BP (!) 164/77   Pulse 76   Temp 97.3 °F (36.3 °C) (Temporal)   Resp 18   Ht 5' 6\" (1.676 m)   Wt 145 lb (65.8 kg)   SpO2 98%   BMI 23.40 kg/m²   Wt Readings from Last 3 Encounters:   04/20/21 145 lb (65.8 kg)     Appearance: Awake, alert, no acute respiratory distress  Skin: Intact, no rash  Head: Normocephalic, atraumatic  Eyes: EOMI, no conjunctival erythema  ENMT: No pharyngeal erythema, MMM, no rhinorrhea  Neck: Supple, no elevated JVP, no carotid bruits  Lungs: Clear to auscultation bilaterally. No wheezes, rales, or rhonchi.   Cardiac: Regular rate and rhythm, +S1S2, no murmurs apparent  Abdomen: Soft, nontender, +bowel sounds  Extremities: Moves all extremities x 4, no lower extremity edema  Neurologic: No focal motor deficits apparent, normal mood and affect  Peripheral Pulses: Intact posterior tibial pulses bilaterally    Intake/Output:    Intake/Output Summary (Last 24 hours) at 4/23/2021 1250  Last data filed at 4/23/2021 0953  Gross per 24 hour   Intake 2614 ml   Output    Net 2614 ml     I/O this shift:  In: 300 [P.O.:300]  Out: -     Laboratory Tests:  Recent Labs     04/20/21 1819 04/21/21  0755 04/22/21  0602    140 145   K 4.8 4.0 4.4    100 108*   CO2 29 31* 28   BUN 25* 21 18   CREATININE 1.1* 1.0 1.0   GLUCOSE 450* 88 177*   CALCIUM 9.0 9.2 8.5*     No results found for: MG  Recent Labs     04/20/21 1819   ALKPHOS 137*   ALT 34*   AST 29   PROT 6.4   BILITOT 0.2   LABALBU 3.1*     Recent Labs     04/20/21 1819 04/21/21  0755 04/22/21  0602   WBC 6.7 7.9 5.7   RBC 3.72 4.03 3.50   HGB 10.3* 11.1* 9.6*   HCT 32.1* 35.3 30.8*   MCV 86.3 87.6 88.0   MCH 27.7 27.5 27.4   MCHC 32.1 31.4* 31.2*   RDW 13.2 13.3 13.5    323 265   MPV 9.6 9.6 9.6     Lab Results   Component Value Date    TROPONINI 0.02 04/22/2021    TROPONINI 0.03 04/21/2021     Lab Results   Component Value Date    INR 1.3 04/20/2021    PROTIME 15.0 (H) 04/20/2021     No results found for: TSHFT4, TSH  No results found for: LABA1C  No results found for: EAG  No results found for: CHOL  No results found for: TRIG  No results found for: HDL  No results found for: LDLCALC, LDLCHOLESTEROL  No results found for: LABVLDL, VLDL  No results found for: CHOLHDLRATIO    Cardiac Tests:  Telemetry findings reviewed: SR at rate 80, no new tachy/bradyarrhythmias overnight     EKG: Normal sinus rhythm, right bundle branch block, indeterminate axis, abnormal EKG.    Vitals and labs were reviewed: Blood pressure 164/77 heart rate 76 temp 97 . 3. Orthostatic blood pressures 9748 with heart rate of 81>> on standing 67/44 with heart rate of 96. Today supine blood pressure 126/63 with heart of 91 standing blood pressure 116/47 with heart rate of 98     LabsBMP normal except for elevated blood sugars. Troponin 0.03>> 0.02 AST/ALT 29/34 H&H 9.6/30.8.     Assessment:  · Syncope most likely secondary to orthostatic hypotension now improved  · Small subdural hematoma secondary to closed head injury and fall  · History of CAD and recent abnormal stress test, status post atherectomy, PCI/drug-eluting stent to mid to proximal LAD underwent on 4/9/2021. · History of paroxysmal atrial fibrillation on Eliquis  · Hypertension, well controlled  · Hyperlipidemia on statin  · Type 2 diabetes  · History of COPD  · Chronic anemia  · Chronic right bundle branch block     Plan:   · Patient was already initiated on aspirin and Plavix and she remained stable. · Continue compression stockings to legs  · Discontinue IV fluids  · Medical records from [facility] were reviewed  · Hold Eliquis, Eliquis can be added later once her subdural hematoma is stabilized after assessing the risks and benefits of anticoagulation therapy. · Continue atorvastatin  · Management of diabetes as per primary service  · Blood pressure slightly elevated now and repeat blood pressure.   If the blood pressure remains elevated then we can consider adding Coreg 3.15 mg p.o. twice daily. · She was advised to drink plenty of fluids at least 64 ounces a day. · She is otherwise stable from the cardiology standpoint for discharge. She needs to follow-up with her primary cardiologist at [facility] in  1 week we will sign off, please call us if have any further questions or concerns. Soo Waldrop MD., Americo Thurman.   [facility]) Cardiology

## 2021-04-23 NOTE — PROGRESS NOTES
Neurosurg progress note  VITALS:  BP (!) 164/77   Pulse 76   Temp 97.3 °F (36.3 °C) (Temporal)   Resp 18   Ht 5' 6\" (1.676 m)   Wt 145 lb (65.8 kg)   SpO2 98%   BMI 23.40 kg/m²   24HR INTAKE/OUTPUT:    Intake/Output Summary (Last 24 hours) at 4/23/2021 1217  Last data filed at 4/23/2021 9553  Gross per 24 hour   Intake 2614 ml   Output    Net 2614 ml     Xr Pelvis (1-2 Views)    Result Date: 4/20/2021  EXAMINATION: ONE XRAY VIEW OF THE PELVIS 4/20/2021 7:10 pm COMPARISON: None. HISTORY: ORDERING SYSTEM PROVIDED HISTORY: trauma TECHNOLOGIST PROVIDED HISTORY: Reason for exam:->trauma What reading provider will be dictating this exam?->CRC FINDINGS: Linear lucency involving the left femoral neck. Findings worrisome for fracture. Dedicated views of the left hip recommended if clinically warranted. Bilateral hips demonstrate normal alignment. No focal osseous lesion. SI joints are symmetric. Probable calcified uterine fibroid right mid pelvis. Linear lucency involving the left femoral neck. Findings worrisome for fracture. Dedicated views of the left hip recommended if clinically warranted. Xr Hand Right (min 3 Views)    Result Date: 4/20/2021  EXAMINATION: THREE XRAY VIEWS OF THE RIGHT HAND 4/20/2021 7:09 pm COMPARISON: None. HISTORY: ORDERING SYSTEM PROVIDED HISTORY: trauma TECHNOLOGIST PROVIDED HISTORY: Reason for exam:->trauma What reading provider will be dictating this exam?->CRC FINDINGS: The alignment of the right hand is normal.  There is no acute fracture or dislocation. There is generalized interphalangeal joint space narrowing. There is volar tilt of the lunate. Severe osteoarthritis of the 1st carpometacarpal joint. Moderate osteoarthritis of the triscaphe joints. Arterial vascular calcifications. No acute osseous injury of the right hand is identified.      Xr Femur Left (min 2 Views)    Result Date: 4/20/2021  EXAMINATION: XRAY VIEWS OF THE LEFT FEMUR 4/20/2021 8:06 pm COMPARISON: None. HISTORY: ORDERING SYSTEM PROVIDED HISTORY: trauma TECHNOLOGIST PROVIDED HISTORY: Reason for exam:->trauma What reading provider will be dictating this exam?->CRC FINDINGS: No fracture or dislocation. Osteoarthritis associated with femoroacetabular articulation. No lytic or blastic bone lesion. Vascular calcifications are present. No fracture or dislocation of left hip. Ct Head Wo Contrast    Result Date: 4/20/2021  EXAMINATION: CT OF THE HEAD WITHOUT CONTRAST  4/20/2021 9:19 pm TECHNIQUE: CT of the head was performed without the administration of intravenous contrast. Dose modulation, iterative reconstruction, and/or weight based adjustment of the mA/kV was utilized to reduce the radiation dose to as low as reasonably achievable. COMPARISON: No prior comparison available at this time. HISTORY: ORDERING SYSTEM PROVIDED HISTORY: trauma transfer from [facility], known SDH. Right frontal parafalcine SDH 1.9cm x 0.6cm x 1.5cm, no shift (please compare) TECHNOLOGIST PROVIDED HISTORY: Has a \"code stroke\" or \"stroke alert\" been called? ->No Reason for exam:->trauma transfer from [facility], known SDH. Right frontal parafalcine SDH 1.9cm x 0.6cm x 1.5cm, no shift (please compare) What reading provider will be dictating this exam?->CRC FINDINGS: Right parafalcine subdural hematoma measures approximately 7 mm in depth with minimal effacement of medial frontal gyrus E. no evidence of intracranial edema. No hydrocephalus. Areas of hypoattenuation are present in periventricular white matter suggestive of chronic microvascular ischemia. No depressed calvarial fracture. Hematoma overlies frontal calvarium. 1. Known right frontal parafalcine subdural hematoma measures approximately 7 mm in depth with minimal local gyral effacement. No prior exam available from outside facility. Short-term follow-up recommended. 2. No evidence of acute intracranial edema. 3. Hematoma overlies frontal calvarium.      Edu Fiore Chest Portable    Result Date: 4/20/2021  EXAMINATION: ONE XRAY VIEW OF THE CHEST 4/20/2021 7:09 pm COMPARISON: None. HISTORY: ORDERING SYSTEM PROVIDED HISTORY: trauma TECHNOLOGIST PROVIDED HISTORY: Reason for exam:->trauma What reading provider will be dictating this exam?->CRC FINDINGS: The lungs are without acute focal process. There is no effusion or pneumothorax. The cardiomediastinal silhouette is without acute process. The osseous structures are without acute process. No acute process.      CBC:   Lab Results   Component Value Date    WBC 5.7 04/22/2021    RBC 3.50 04/22/2021    HGB 9.6 04/22/2021    HCT 30.8 04/22/2021    MCV 88.0 04/22/2021    MCH 27.4 04/22/2021    MCHC 31.2 04/22/2021    RDW 13.5 04/22/2021     04/22/2021    MPV 9.6 04/22/2021     BMP:    Lab Results   Component Value Date     04/22/2021    K 4.4 04/22/2021     04/22/2021    CO2 28 04/22/2021    BUN 18 04/22/2021    LABALBU 3.1 04/20/2021    CREATININE 1.0 04/22/2021    CALCIUM 8.5 04/22/2021    GFRAA >60 04/22/2021    LABGLOM 54 04/22/2021    GLUCOSE 177 04/22/2021      melatonin  6 mg Oral Nightly    atorvastatin  40 mg Oral Daily    insulin glargine  22 Units Subcutaneous QAM    sodium chloride flush  10 mL Intravenous 2 times per day    bisacodyl  10 mg Oral Daily    sennosides-docusate sodium  2 tablet Oral BID    neomycin-bacitracin-polymyxin   Ophthalmic 4 times per day    neomycin-bacitracin-polymyxin   Topical BID    levETIRAcetam  500 mg Oral BID    insulin lispro  0-18 Units Subcutaneous TID WC    insulin lispro  0-9 Units Subcutaneous Nightly     Awake and alert, follows commands perrl eomi   Assessment:  Patient Active Problem List   Diagnosis    Fall from standing    Concussion    Cephalohematoma    Facial abrasion, initial encounter    Subdural hematoma (HCC)     Plan:Continue current care  Bianca Dickerson M.D.

## 2021-04-23 NOTE — PROGRESS NOTES
OK for asa/plavix/prophylactic Lovenox  Discussed with  hold eliquis  Until complete resolution intracranial blood by head ct

## 2021-04-24 ENCOUNTER — APPOINTMENT (OUTPATIENT)
Dept: CT IMAGING | Age: 76
DRG: 084 | End: 2021-04-24
Payer: MEDICARE

## 2021-04-24 LAB
METER GLUCOSE: 180 MG/DL (ref 74–99)
METER GLUCOSE: 244 MG/DL (ref 74–99)
METER GLUCOSE: 257 MG/DL (ref 74–99)
METER GLUCOSE: 302 MG/DL (ref 74–99)

## 2021-04-24 PROCEDURE — 2060000000 HC ICU INTERMEDIATE R&B

## 2021-04-24 PROCEDURE — 6370000000 HC RX 637 (ALT 250 FOR IP): Performed by: STUDENT IN AN ORGANIZED HEALTH CARE EDUCATION/TRAINING PROGRAM

## 2021-04-24 PROCEDURE — 6360000002 HC RX W HCPCS: Performed by: STUDENT IN AN ORGANIZED HEALTH CARE EDUCATION/TRAINING PROGRAM

## 2021-04-24 PROCEDURE — 6370000000 HC RX 637 (ALT 250 FOR IP): Performed by: SURGERY

## 2021-04-24 PROCEDURE — 99232 SBSQ HOSP IP/OBS MODERATE 35: CPT | Performed by: SURGERY

## 2021-04-24 PROCEDURE — 70450 CT HEAD/BRAIN W/O DYE: CPT

## 2021-04-24 PROCEDURE — 2580000003 HC RX 258: Performed by: STUDENT IN AN ORGANIZED HEALTH CARE EDUCATION/TRAINING PROGRAM

## 2021-04-24 PROCEDURE — 82962 GLUCOSE BLOOD TEST: CPT

## 2021-04-24 RX ADMIN — Medication 10 ML: at 22:33

## 2021-04-24 RX ADMIN — ATORVASTATIN CALCIUM 40 MG: 40 TABLET, FILM COATED ORAL at 09:24

## 2021-04-24 RX ADMIN — CLOPIDOGREL 75 MG: 75 TABLET, FILM COATED ORAL at 09:23

## 2021-04-24 RX ADMIN — NEOMYCIN SULFATE, POLYMYXIN B SULFATE AND BACITRACIN ZINC: 3.5; 10000; 4 OINTMENT OPHTHALMIC at 13:35

## 2021-04-24 RX ADMIN — INSULIN LISPRO 9 UNITS: 100 INJECTION, SOLUTION INTRAVENOUS; SUBCUTANEOUS at 18:16

## 2021-04-24 RX ADMIN — INSULIN LISPRO 3 UNITS: 100 INJECTION, SOLUTION INTRAVENOUS; SUBCUTANEOUS at 09:27

## 2021-04-24 RX ADMIN — INSULIN LISPRO 6 UNITS: 100 INJECTION, SOLUTION INTRAVENOUS; SUBCUTANEOUS at 13:34

## 2021-04-24 RX ADMIN — NEOMYCIN SULFATE, POLYMYXIN B SULFATE AND BACITRACIN ZINC: 3.5; 10000; 4 OINTMENT OPHTHALMIC at 00:00

## 2021-04-24 RX ADMIN — INSULIN GLARGINE 30 UNITS: 100 INJECTION, SOLUTION SUBCUTANEOUS at 09:28

## 2021-04-24 RX ADMIN — HEPARIN SODIUM 5000 UNITS: 10000 INJECTION INTRAVENOUS; SUBCUTANEOUS at 14:48

## 2021-04-24 RX ADMIN — ACETAMINOPHEN 650 MG: 325 TABLET ORAL at 09:27

## 2021-04-24 RX ADMIN — ASPIRIN 81 MG: 81 TABLET, COATED ORAL at 09:23

## 2021-04-24 RX ADMIN — INSULIN LISPRO 6 UNITS: 100 INJECTION, SOLUTION INTRAVENOUS; SUBCUTANEOUS at 20:04

## 2021-04-24 RX ADMIN — BACITRACIN, NEOMYCIN, POLYMYXIN B 1 G: 400; 3.5; 5 OINTMENT TOPICAL at 09:27

## 2021-04-24 RX ADMIN — Medication 10 ML: at 09:24

## 2021-04-24 RX ADMIN — LEVETIRACETAM 500 MG: 500 TABLET ORAL at 20:04

## 2021-04-24 RX ADMIN — NEOMYCIN SULFATE, POLYMYXIN B SULFATE AND BACITRACIN ZINC: 3.5; 10000; 4 OINTMENT OPHTHALMIC at 18:16

## 2021-04-24 RX ADMIN — HEPARIN SODIUM 5000 UNITS: 10000 INJECTION INTRAVENOUS; SUBCUTANEOUS at 05:15

## 2021-04-24 RX ADMIN — NEOMYCIN SULFATE, POLYMYXIN B SULFATE AND BACITRACIN ZINC: 3.5; 10000; 4 OINTMENT OPHTHALMIC at 05:33

## 2021-04-24 RX ADMIN — Medication 6 MG: at 22:33

## 2021-04-24 RX ADMIN — LEVETIRACETAM 500 MG: 500 TABLET ORAL at 09:23

## 2021-04-24 ASSESSMENT — PAIN SCALES - GENERAL
PAINLEVEL_OUTOF10: 3
PAINLEVEL_OUTOF10: 0

## 2021-04-24 NOTE — PROGRESS NOTES
Neurosurg progress note  VITALS:  /63   Pulse 73   Temp 97 °F (36.1 °C) (Temporal)   Resp 14   Ht 5' 6\" (1.676 m)   Wt 145 lb (65.8 kg)   SpO2 98%   BMI 23.40 kg/m²   24HR INTAKE/OUTPUT:    Intake/Output Summary (Last 24 hours) at 4/24/2021 0951  Last data filed at 4/23/2021 0624  Gross per 24 hour   Intake 300 ml   Output    Net 300 ml     Xr Pelvis (1-2 Views)    Result Date: 4/20/2021  EXAMINATION: ONE XRAY VIEW OF THE PELVIS 4/20/2021 7:10 pm COMPARISON: None. HISTORY: ORDERING SYSTEM PROVIDED HISTORY: trauma TECHNOLOGIST PROVIDED HISTORY: Reason for exam:->trauma What reading provider will be dictating this exam?->CRC FINDINGS: Linear lucency involving the left femoral neck. Findings worrisome for fracture. Dedicated views of the left hip recommended if clinically warranted. Bilateral hips demonstrate normal alignment. No focal osseous lesion. SI joints are symmetric. Probable calcified uterine fibroid right mid pelvis. Linear lucency involving the left femoral neck. Findings worrisome for fracture. Dedicated views of the left hip recommended if clinically warranted. Xr Hand Right (min 3 Views)    Result Date: 4/20/2021  EXAMINATION: THREE XRAY VIEWS OF THE RIGHT HAND 4/20/2021 7:09 pm COMPARISON: None. HISTORY: ORDERING SYSTEM PROVIDED HISTORY: trauma TECHNOLOGIST PROVIDED HISTORY: Reason for exam:->trauma What reading provider will be dictating this exam?->CRC FINDINGS: The alignment of the right hand is normal.  There is no acute fracture or dislocation. There is generalized interphalangeal joint space narrowing. There is volar tilt of the lunate. Severe osteoarthritis of the 1st carpometacarpal joint. Moderate osteoarthritis of the triscaphe joints. Arterial vascular calcifications. No acute osseous injury of the right hand is identified.      Xr Femur Left (min 2 Views)    Result Date: 4/20/2021  EXAMINATION: XRAY VIEWS OF THE LEFT FEMUR 4/20/2021 8:06 pm COMPARISON: None. HISTORY: ORDERING SYSTEM PROVIDED HISTORY: trauma TECHNOLOGIST PROVIDED HISTORY: Reason for exam:->trauma What reading provider will be dictating this exam?->CRC FINDINGS: No fracture or dislocation. Osteoarthritis associated with femoroacetabular articulation. No lytic or blastic bone lesion. Vascular calcifications are present. No fracture or dislocation of left hip. Ct Head Wo Contrast    Result Date: 4/20/2021  EXAMINATION: CT OF THE HEAD WITHOUT CONTRAST  4/20/2021 9:19 pm TECHNIQUE: CT of the head was performed without the administration of intravenous contrast. Dose modulation, iterative reconstruction, and/or weight based adjustment of the mA/kV was utilized to reduce the radiation dose to as low as reasonably achievable. COMPARISON: No prior comparison available at this time. HISTORY: ORDERING SYSTEM PROVIDED HISTORY: trauma transfer from [facility], known SDH. Right frontal parafalcine SDH 1.9cm x 0.6cm x 1.5cm, no shift (please compare) TECHNOLOGIST PROVIDED HISTORY: Has a \"code stroke\" or \"stroke alert\" been called? ->No Reason for exam:->trauma transfer from [facility], known SDH. Right frontal parafalcine SDH 1.9cm x 0.6cm x 1.5cm, no shift (please compare) What reading provider will be dictating this exam?->CRC FINDINGS: Right parafalcine subdural hematoma measures approximately 7 mm in depth with minimal effacement of medial frontal gyrus E. no evidence of intracranial edema. No hydrocephalus. Areas of hypoattenuation are present in periventricular white matter suggestive of chronic microvascular ischemia. No depressed calvarial fracture. Hematoma overlies frontal calvarium. 1. Known right frontal parafalcine subdural hematoma measures approximately 7 mm in depth with minimal local gyral effacement. No prior exam available from outside facility. Short-term follow-up recommended. 2. No evidence of acute intracranial edema. 3. Hematoma overlies frontal calvarium.      Xr Chest Portable Result Date: 4/20/2021  EXAMINATION: ONE XRAY VIEW OF THE CHEST 4/20/2021 7:09 pm COMPARISON: None. HISTORY: ORDERING SYSTEM PROVIDED HISTORY: trauma TECHNOLOGIST PROVIDED HISTORY: Reason for exam:->trauma What reading provider will be dictating this exam?->CRC FINDINGS: The lungs are without acute focal process. There is no effusion or pneumothorax. The cardiomediastinal silhouette is without acute process. The osseous structures are without acute process. No acute process.      CBC:   Lab Results   Component Value Date    WBC 5.7 04/22/2021    RBC 3.50 04/22/2021    HGB 9.6 04/22/2021    HCT 30.8 04/22/2021    MCV 88.0 04/22/2021    MCH 27.4 04/22/2021    MCHC 31.2 04/22/2021    RDW 13.5 04/22/2021     04/22/2021    MPV 9.6 04/22/2021     BMP:    Lab Results   Component Value Date     04/22/2021    K 4.4 04/22/2021     04/22/2021    CO2 28 04/22/2021    BUN 18 04/22/2021    LABALBU 3.1 04/20/2021    CREATININE 1.0 04/22/2021    CALCIUM 8.5 04/22/2021    GFRAA >60 04/22/2021    LABGLOM 54 04/22/2021    GLUCOSE 177 04/22/2021      melatonin  6 mg Oral Nightly    aspirin  81 mg Oral Daily    heparin (porcine)  5,000 Units Subcutaneous 3 times per day    clopidogrel  75 mg Oral BID    insulin glargine  30 Units Subcutaneous QAM    atorvastatin  40 mg Oral Daily    sodium chloride flush  10 mL Intravenous 2 times per day    bisacodyl  10 mg Oral Daily    sennosides-docusate sodium  2 tablet Oral BID    neomycin-bacitracin-polymyxin   Ophthalmic 4 times per day    neomycin-bacitracin-polymyxin   Topical BID    levETIRAcetam  500 mg Oral BID    insulin lispro  0-18 Units Subcutaneous TID WC    insulin lispro  0-9 Units Subcutaneous Nightly     Awake and alert, follows commands perrl eomi   Assessment:  Patient Active Problem List   Diagnosis    Fall from standing    Concussion    Cephalohematoma    Facial abrasion, initial encounter    Subdural hematoma (HCC)     Plan:Continue

## 2021-04-24 NOTE — PROGRESS NOTES
[facility]  SURGICAL INTENSIVE CARE UNIT (SICU)  ATTENDING PHYSICIAN CRITICAL CARE PROGRESS NOTE     I have examined the patient,reviewed the record, and discussed the case with the APN/  Resident. I have reviewed all relevant labs and imaging data. The following summarizes my clinical findings and independent assessment. Date of admission:  4/20/2021    CC: Fall out of her car     HOSPITAL COURSE:     4/20: Patient was a trauma transfer from [facility] after falling out of her car and hitting her head. Patient sustained subdural hematoma measuring 7 mm on radiology read from [facility] however [facility] did not provide disc for imaging. Repeat head CT here in [place] [facility] also demonstrating 7 mm subdural hematoma, which is stable. GCS 15 on presentation patient is on Eliquis for A. fib and aspirin Plavix for history of stents which have been held  4/21: She has 15 this morning. Patient endorsing headache. No new findings on tertiary exam.   4/22: After speaking with patient yesterday evening it was revealed she got lightheaded after going from seated to standing position before the fall. Trop 0.03, EKG demonstrating RBBB, Echo pending. Consulted cardiology  4/22: After speaking with patient yesterday evening it was revealed she got lightheaded after going from seated to standing position before the fall. Trop 0.03, EKG demonstrating RBBB,  Consulted cardiology  4/23 Patient doing well. Tolerating a diet. Wants to go home    4/24: Pain controlled. No complaints. Restarted ASA/Plavix yesterday due to recent drug eluting stents. No clinical signs of intracranial hemorrhage. DC home today.       Physical Exam:  Physical Exam  HENT:      Head: Normocephalic. Comments: Frontal cephalohematoma and abrasion   Eyes:      Extraocular Movements: Extraocular movements intact. Pupils: Pupils are equal, round, and reactive to light. Neck:      Musculoskeletal: Neck supple.    Cardiovascular: Rate and Rhythm: Normal rate. Pulmonary:      Effort: Pulmonary effort is normal. No respiratory distress. Abdominal:      General: Abdomen is flat. There is no distension. Tenderness: There is no abdominal tenderness. Musculoskeletal: Normal range of motion. Comments:  Chronic right foot would present prior to arrival    Skin:     General: Skin is warm. Neurological:      General: No focal deficit present. Mental Status: She is alert. Assessment   Active Problems:    Fall from standing    Concussion    Cephalohematoma    Facial abrasion, initial encounter    Subdural hematoma ([address]  Resolved Problems:    * No resolved hospital problems. *      Plan   GI: Regular Diet , Senakot  Dulcolax   Neuro: prn Tylenol   NS following for SDH 7mm and stable from [place] to here. ( no disc from [facility] )  ASA and Plavix (discussed with NS) as patient had drug eluting stents this month. Renal: Hep lock IV, Monitor Urine Output  Musculoskeletal: WBAT all extremities , Spines Clear AM-PAC Score 18/24 Chronic right foot wound present prior to arrival - Compression stocking LLE   Pulmonary: Aggressive pulmonary hygiene , SMI , Monitor RR and Maintain SpO2 > 92%  ID: No Indication for Antibiotics      Heme: No indication for Transfusion   Cardiac: Monitor Hemodynamics Orthostatic hypotension during which revealed that this happened prior to her fall as well. Cardiology consulted- appreciate recommendations- orthostatic hypotension  Restart ASA, Plavix Hold eliquis discussed with NS   Endocrine:  Lantus ( increased from home dose 30U) and SSI     DVT Prophylaxis: PCDs,  Heparin prophylaxis   Ulcer Prophylaxis: No Ulcer Prophylaxis Indicated   Tubes and Lines: Continue PIV   Seizure proph:     Keppra  Ancillary consults:   Neurosurgery and PT/OT    Family Update:         As available   CODE Status:       Full Code    Dispo: If CT scan this afternoon remains stable patient will be stable for discharge. With discussion with the patient she lives alone but her sister who is a nurse lives close by she is also part of life program that we will check on her. I explained to her that as she has a subdural hematoma and is on aspirin and Plavix and has orthostatic hypotension her going home alone is dangerous. She states she does not want to go to a rehab and wants to go home. With her PT score of 18/24 if appropriate people can check on her will discharge home.     Alicia Martin MD

## 2021-04-25 LAB
METER GLUCOSE: 140 MG/DL (ref 74–99)
METER GLUCOSE: 218 MG/DL (ref 74–99)
METER GLUCOSE: 223 MG/DL (ref 74–99)
METER GLUCOSE: 253 MG/DL (ref 74–99)

## 2021-04-25 PROCEDURE — 6370000000 HC RX 637 (ALT 250 FOR IP): Performed by: SURGERY

## 2021-04-25 PROCEDURE — 82962 GLUCOSE BLOOD TEST: CPT

## 2021-04-25 PROCEDURE — 2580000003 HC RX 258: Performed by: STUDENT IN AN ORGANIZED HEALTH CARE EDUCATION/TRAINING PROGRAM

## 2021-04-25 PROCEDURE — 6370000000 HC RX 637 (ALT 250 FOR IP): Performed by: STUDENT IN AN ORGANIZED HEALTH CARE EDUCATION/TRAINING PROGRAM

## 2021-04-25 PROCEDURE — 99233 SBSQ HOSP IP/OBS HIGH 50: CPT | Performed by: SURGERY

## 2021-04-25 PROCEDURE — 2060000000 HC ICU INTERMEDIATE R&B

## 2021-04-25 RX ORDER — CLOPIDOGREL BISULFATE 75 MG/1
75 TABLET ORAL DAILY
Status: DISCONTINUED | OUTPATIENT
Start: 2021-04-25 | End: 2021-04-26 | Stop reason: HOSPADM

## 2021-04-25 RX ORDER — INSULIN GLARGINE 100 [IU]/ML
40 INJECTION, SOLUTION SUBCUTANEOUS EVERY MORNING
Status: DISCONTINUED | OUTPATIENT
Start: 2021-04-26 | End: 2021-04-26 | Stop reason: HOSPADM

## 2021-04-25 RX ADMIN — Medication 10 ML: at 21:08

## 2021-04-25 RX ADMIN — NEOMYCIN SULFATE, POLYMYXIN B SULFATE AND BACITRACIN ZINC: 3.5; 10000; 4 OINTMENT OPHTHALMIC at 05:26

## 2021-04-25 RX ADMIN — ATORVASTATIN CALCIUM 40 MG: 40 TABLET, FILM COATED ORAL at 09:33

## 2021-04-25 RX ADMIN — INSULIN GLARGINE 30 UNITS: 100 INJECTION, SOLUTION SUBCUTANEOUS at 09:33

## 2021-04-25 RX ADMIN — LEVETIRACETAM 500 MG: 500 TABLET ORAL at 09:40

## 2021-04-25 RX ADMIN — NEOMYCIN SULFATE, POLYMYXIN B SULFATE AND BACITRACIN ZINC: 3.5; 10000; 4 OINTMENT OPHTHALMIC at 00:40

## 2021-04-25 RX ADMIN — ASPIRIN 81 MG: 81 TABLET, COATED ORAL at 09:33

## 2021-04-25 RX ADMIN — LEVETIRACETAM 500 MG: 500 TABLET ORAL at 21:07

## 2021-04-25 RX ADMIN — INSULIN LISPRO 5 UNITS: 100 INJECTION, SOLUTION INTRAVENOUS; SUBCUTANEOUS at 21:07

## 2021-04-25 RX ADMIN — CLOPIDOGREL 75 MG: 75 TABLET, FILM COATED ORAL at 09:33

## 2021-04-25 RX ADMIN — INSULIN LISPRO 6 UNITS: 100 INJECTION, SOLUTION INTRAVENOUS; SUBCUTANEOUS at 17:47

## 2021-04-25 RX ADMIN — Medication 10 ML: at 09:40

## 2021-04-25 RX ADMIN — Medication 6 MG: at 21:07

## 2021-04-25 RX ADMIN — INSULIN LISPRO 6 UNITS: 100 INJECTION, SOLUTION INTRAVENOUS; SUBCUTANEOUS at 12:15

## 2021-04-25 ASSESSMENT — PAIN SCALES - GENERAL: PAINLEVEL_OUTOF10: 0

## 2021-04-25 NOTE — PROGRESS NOTES
Neurosurg progress note  VITALS:  BP (!) 120/58   Pulse 80   Temp 97 °F (36.1 °C) (Temporal)   Resp 16   Ht 5' 6\" (1.676 m)   Wt 145 lb (65.8 kg)   SpO2 97%   BMI 23.40 kg/m²   24HR INTAKE/OUTPUT:    Intake/Output Summary (Last 24 hours) at 4/25/2021 1038  Last data filed at 4/24/2021 1515  Gross per 24 hour   Intake 80 ml   Output    Net 80 ml     Xr Pelvis (1-2 Views)    Result Date: 4/20/2021  EXAMINATION: ONE XRAY VIEW OF THE PELVIS 4/20/2021 7:10 pm COMPARISON: None. HISTORY: ORDERING SYSTEM PROVIDED HISTORY: trauma TECHNOLOGIST PROVIDED HISTORY: Reason for exam:->trauma What reading provider will be dictating this exam?->CRC FINDINGS: Linear lucency involving the left femoral neck. Findings worrisome for fracture. Dedicated views of the left hip recommended if clinically warranted. Bilateral hips demonstrate normal alignment. No focal osseous lesion. SI joints are symmetric. Probable calcified uterine fibroid right mid pelvis. Linear lucency involving the left femoral neck. Findings worrisome for fracture. Dedicated views of the left hip recommended if clinically warranted. Xr Hand Right (min 3 Views)    Result Date: 4/20/2021  EXAMINATION: THREE XRAY VIEWS OF THE RIGHT HAND 4/20/2021 7:09 pm COMPARISON: None. HISTORY: ORDERING SYSTEM PROVIDED HISTORY: trauma TECHNOLOGIST PROVIDED HISTORY: Reason for exam:->trauma What reading provider will be dictating this exam?->CRC FINDINGS: The alignment of the right hand is normal.  There is no acute fracture or dislocation. There is generalized interphalangeal joint space narrowing. There is volar tilt of the lunate. Severe osteoarthritis of the 1st carpometacarpal joint. Moderate osteoarthritis of the triscaphe joints. Arterial vascular calcifications. No acute osseous injury of the right hand is identified.      Xr Femur Left (min 2 Views)    Result Date: 4/20/2021  EXAMINATION: XRAY VIEWS OF THE LEFT FEMUR 4/20/2021 8:06 pm COMPARISON: None. HISTORY: ORDERING SYSTEM PROVIDED HISTORY: trauma TECHNOLOGIST PROVIDED HISTORY: Reason for exam:->trauma What reading provider will be dictating this exam?->CRC FINDINGS: No fracture or dislocation. Osteoarthritis associated with femoroacetabular articulation. No lytic or blastic bone lesion. Vascular calcifications are present. No fracture or dislocation of left hip. Ct Head Wo Contrast    Result Date: 4/20/2021  EXAMINATION: CT OF THE HEAD WITHOUT CONTRAST  4/20/2021 9:19 pm TECHNIQUE: CT of the head was performed without the administration of intravenous contrast. Dose modulation, iterative reconstruction, and/or weight based adjustment of the mA/kV was utilized to reduce the radiation dose to as low as reasonably achievable. COMPARISON: No prior comparison available at this time. HISTORY: ORDERING SYSTEM PROVIDED HISTORY: trauma transfer from [facility], known SDH. Right frontal parafalcine SDH 1.9cm x 0.6cm x 1.5cm, no shift (please compare) TECHNOLOGIST PROVIDED HISTORY: Has a \"code stroke\" or \"stroke alert\" been called? ->No Reason for exam:->trauma transfer from [facility], known SDH. Right frontal parafalcine SDH 1.9cm x 0.6cm x 1.5cm, no shift (please compare) What reading provider will be dictating this exam?->CRC FINDINGS: Right parafalcine subdural hematoma measures approximately 7 mm in depth with minimal effacement of medial frontal gyrus E. no evidence of intracranial edema. No hydrocephalus. Areas of hypoattenuation are present in periventricular white matter suggestive of chronic microvascular ischemia. No depressed calvarial fracture. Hematoma overlies frontal calvarium. 1. Known right frontal parafalcine subdural hematoma measures approximately 7 mm in depth with minimal local gyral effacement. No prior exam available from outside facility. Short-term follow-up recommended. 2. No evidence of acute intracranial edema. 3. Hematoma overlies frontal calvarium.      Xr Chest Portable Result Date: 4/20/2021  EXAMINATION: ONE XRAY VIEW OF THE CHEST 4/20/2021 7:09 pm COMPARISON: None. HISTORY: ORDERING SYSTEM PROVIDED HISTORY: trauma TECHNOLOGIST PROVIDED HISTORY: Reason for exam:->trauma What reading provider will be dictating this exam?->CRC FINDINGS: The lungs are without acute focal process. There is no effusion or pneumothorax. The cardiomediastinal silhouette is without acute process. The osseous structures are without acute process. No acute process.      CBC:   Lab Results   Component Value Date    WBC 5.7 04/22/2021    RBC 3.50 04/22/2021    HGB 9.6 04/22/2021    HCT 30.8 04/22/2021    MCV 88.0 04/22/2021    MCH 27.4 04/22/2021    MCHC 31.2 04/22/2021    RDW 13.5 04/22/2021     04/22/2021    MPV 9.6 04/22/2021     BMP:    Lab Results   Component Value Date     04/22/2021    K 4.4 04/22/2021     04/22/2021    CO2 28 04/22/2021    BUN 18 04/22/2021    LABALBU 3.1 04/20/2021    CREATININE 1.0 04/22/2021    CALCIUM 8.5 04/22/2021    GFRAA >60 04/22/2021    LABGLOM 54 04/22/2021    GLUCOSE 177 04/22/2021      clopidogrel  75 mg Oral Daily    [START ON 4/26/2021] insulin glargine  40 Units Subcutaneous QAM    melatonin  6 mg Oral Nightly    aspirin  81 mg Oral Daily    atorvastatin  40 mg Oral Daily    sodium chloride flush  10 mL Intravenous 2 times per day    bisacodyl  10 mg Oral Daily    sennosides-docusate sodium  2 tablet Oral BID    neomycin-bacitracin-polymyxin   Ophthalmic 4 times per day    neomycin-bacitracin-polymyxin   Topical BID    levETIRAcetam  500 mg Oral BID    insulin lispro  0-18 Units Subcutaneous TID WC    insulin lispro  0-9 Units Subcutaneous Nightly     Awake and alert, speech fluent perrl eomi motor full  Assessment:  Patient Active Problem List   Diagnosis    Fall from standing    Concussion    Cephalohematoma    Facial abrasion, initial encounter    Subdural hematoma (HCC)     Plan:plavix QD hold Lovenox otherwise  Continue current care  Jenni Jimenez M.D.

## 2021-04-25 NOTE — PROGRESS NOTES
[facility]  SURGICAL INTENSIVE CARE UNIT (SICU)  ATTENDING PHYSICIAN CRITICAL CARE PROGRESS NOTE     I have examined the patient,reviewed the record, and discussed the case with the APN/  Resident. I have reviewed all relevant labs and imaging data. The following summarizes my clinical findings and independent assessment. Date of admission:  4/20/2021    CC: Fall out of her car     HOSPITAL COURSE:     4/20: Patient was a trauma transfer from [facility] after falling out of her car and hitting her head. Patient sustained subdural hematoma measuring 7 mm on radiology read from [facility] however [facility] did not provide disc for imaging. Repeat head CT [place] also demonstrating 7 mm subdural hematoma, which is stable. GCS 15 on presentation patient is on Eliquis for A. fib and aspirin Plavix for history of stents which have been held  4/21: She has 15 this morning. Patient endorsing headache. No new findings on tertiary exam.   4/22: After speaking with patient yesterday evening it was revealed she got lightheaded after going from seated to standing position before the fall. Trop 0.03, EKG demonstrating RBBB, Echo pending. Consulted cardiology  4/22: After speaking with patient yesterday evening it was revealed she got lightheaded after going from seated to standing position before the fall. Trop 0.03, EKG demonstrating RBBB,  Consulted cardiology  4/23 Patient doing well. Tolerating a diet. Wants to go home    4/24: Pain controlled. No complaints. Restarted ASA/Plavix yesterday due to recent drug eluting stents. No clinical signs of intracranial hemorrhage. DC home today.   4/25-Ct head was unstable, but repeat in the PM was stable. Stopped all thinners. No other acute issues overnight. Awaiting neurology recs to restart thinners due to recent stent placement           Physical Exam:  Physical Exam  HENT:      Head: Normocephalic.       Comments: Frontal cephalohematoma and abrasion Eyes:      Extraocular Movements: Extraocular movements intact. Pupils: Pupils are equal, round, and reactive to light. Neck:      Musculoskeletal: Neck supple. Cardiovascular:      Rate and Rhythm: Normal rate. Pulmonary:      Effort: Pulmonary effort is normal. No respiratory distress. Abdominal:      General: Abdomen is flat. There is no distension. Tenderness: There is no abdominal tenderness. Musculoskeletal: Normal range of motion. Comments:  Chronic right foot would present prior to arrival    Skin:     General: Skin is warm. Neurological:      General: No focal deficit present. Mental Status: She is alert. Assessment   Active Problems:    Fall from standing    Concussion    Cephalohematoma    Facial abrasion, initial encounter    Subdural hematoma ([address]  Resolved Problems:    * No resolved hospital problems. *      Plan   GI: Regular Diet , Senakot  Dulcolax   Neuro: prn Tylenol   NS following for SDH 7mm and stable from [facility] to here. ( no disc from [facility] )  ASA and Plavix (discussed with NS) as patient had drug eluting stents this month- repeat scan slightly worse then stable again after antiplatelets and DVT prophylaxis   Renal: Hep lock IV, Monitor Urine Output  Musculoskeletal: WBAT all extremities , Spines Clear AM-PAC Score 18/24 Chronic right foot wound present prior to arrival - Compression stocking LLE   Pulmonary: Aggressive pulmonary hygiene , SMI , Monitor RR and Maintain SpO2 > 92%  ID: No Indication for Antibiotics      Heme: No indication for Transfusion   Cardiac: Monitor Hemodynamics Orthostatic hypotension during which revealed that this happened prior to her fall as well. Cardiology consulted- appreciate recommendations- orthostatic hypotension  Restart ASA, Plavix again ( patient was on plavix BID only needed daily per Cardiology.   Hold eliquis discussed with NS   Endocrine:  Lantus ( increased from home dose 40U) and SSI     DVT Prophylaxis: PCDs,  Heparin prophylaxis stopped yesterday secondary to worsening SDH   Ulcer Prophylaxis: No Ulcer Prophylaxis Indicated   Tubes and Lines: Continue PIV   Seizure proph:     Keppra  Ancillary consults:   Neurosurgery and PT/OT    Family Update:         As available   CODE Status:       Full Code    Dispo: If CT stable tomorrow OK for DC as long as she can have her sister check on her     Lamberto Vicente MD

## 2021-04-26 ENCOUNTER — APPOINTMENT (OUTPATIENT)
Dept: CT IMAGING | Age: 76
DRG: 084 | End: 2021-04-26
Payer: MEDICARE

## 2021-04-26 VITALS
HEART RATE: 93 BPM | RESPIRATION RATE: 16 BRPM | TEMPERATURE: 98.2 F | DIASTOLIC BLOOD PRESSURE: 53 MMHG | SYSTOLIC BLOOD PRESSURE: 136 MMHG | WEIGHT: 145 LBS | BODY MASS INDEX: 23.3 KG/M2 | OXYGEN SATURATION: 97 % | HEIGHT: 66 IN

## 2021-04-26 LAB
METER GLUCOSE: 238 MG/DL (ref 74–99)
METER GLUCOSE: 239 MG/DL (ref 74–99)

## 2021-04-26 PROCEDURE — 82962 GLUCOSE BLOOD TEST: CPT

## 2021-04-26 PROCEDURE — 6370000000 HC RX 637 (ALT 250 FOR IP): Performed by: STUDENT IN AN ORGANIZED HEALTH CARE EDUCATION/TRAINING PROGRAM

## 2021-04-26 PROCEDURE — 6370000000 HC RX 637 (ALT 250 FOR IP): Performed by: SURGERY

## 2021-04-26 PROCEDURE — 99232 SBSQ HOSP IP/OBS MODERATE 35: CPT | Performed by: SURGERY

## 2021-04-26 PROCEDURE — 70450 CT HEAD/BRAIN W/O DYE: CPT

## 2021-04-26 PROCEDURE — 2580000003 HC RX 258: Performed by: STUDENT IN AN ORGANIZED HEALTH CARE EDUCATION/TRAINING PROGRAM

## 2021-04-26 RX ADMIN — BISACODYL 10 MG: 5 TABLET, COATED ORAL at 08:14

## 2021-04-26 RX ADMIN — INSULIN LISPRO 6 UNITS: 100 INJECTION, SOLUTION INTRAVENOUS; SUBCUTANEOUS at 08:14

## 2021-04-26 RX ADMIN — DOCUSATE SODIUM 50 MG AND SENNOSIDES 8.6 MG 2 TABLET: 8.6; 5 TABLET, FILM COATED ORAL at 08:14

## 2021-04-26 RX ADMIN — ASPIRIN 81 MG: 81 TABLET, COATED ORAL at 08:14

## 2021-04-26 RX ADMIN — Medication 10 ML: at 08:45

## 2021-04-26 RX ADMIN — BACITRACIN, NEOMYCIN, POLYMYXIN B 1 G: 400; 3.5; 5 OINTMENT TOPICAL at 08:14

## 2021-04-26 RX ADMIN — ATORVASTATIN CALCIUM 40 MG: 40 TABLET, FILM COATED ORAL at 08:14

## 2021-04-26 RX ADMIN — INSULIN LISPRO 6 UNITS: 100 INJECTION, SOLUTION INTRAVENOUS; SUBCUTANEOUS at 12:30

## 2021-04-26 RX ADMIN — NEOMYCIN SULFATE, POLYMYXIN B SULFATE AND BACITRACIN ZINC: 3.5; 10000; 4 OINTMENT OPHTHALMIC at 00:36

## 2021-04-26 RX ADMIN — INSULIN GLARGINE 40 UNITS: 100 INJECTION, SOLUTION SUBCUTANEOUS at 08:13

## 2021-04-26 RX ADMIN — NEOMYCIN SULFATE, POLYMYXIN B SULFATE AND BACITRACIN ZINC: 3.5; 10000; 4 OINTMENT OPHTHALMIC at 12:32

## 2021-04-26 RX ADMIN — LEVETIRACETAM 500 MG: 500 TABLET ORAL at 08:14

## 2021-04-26 RX ADMIN — NEOMYCIN SULFATE, POLYMYXIN B SULFATE AND BACITRACIN ZINC: 3.5; 10000; 4 OINTMENT OPHTHALMIC at 05:17

## 2021-04-26 RX ADMIN — CLOPIDOGREL 75 MG: 75 TABLET, FILM COATED ORAL at 08:14

## 2021-04-26 ASSESSMENT — PAIN SCALES - GENERAL: PAINLEVEL_OUTOF10: 0

## 2021-04-26 NOTE — PLAN OF CARE
Problem: Pain:  Goal: Pain level will decrease  Description: Pain level will decrease  4/26/2021 1526 by Augustin Persaud RN  Outcome: Completed  4/26/2021 0756 by Augustin Persaud RN  Outcome: Ongoing  4/26/2021 0618 by Olga Bass RN  Outcome: Met This Shift  Goal: Control of acute pain  Description: Control of acute pain  4/26/2021 1526 by Augustin Persaud RN  Outcome: Completed  4/26/2021 0756 by Augustin Persaud RN  Outcome: Ongoing  4/26/2021 0618 by Olga Bass RN  Outcome: Met This Shift  Goal: Control of chronic pain  Description: Control of chronic pain  4/26/2021 1526 by Augustin Persaud RN  Outcome: Completed  4/26/2021 0756 by Augustin Persaud RN  Outcome: Ongoing  4/26/2021 0618 by Olga Bass RN  Outcome: Met This Shift     Problem: Falls - Risk of:  Goal: Will remain free from falls  Description: Will remain free from falls  4/26/2021 1526 by Augustin Persaud RN  Outcome: Completed  4/26/2021 0756 by Augustin Persaud RN  Outcome: Ongoing  4/26/2021 0618 by Olga Bass RN  Outcome: Met This Shift  Goal: Absence of physical injury  Description: Absence of physical injury  4/26/2021 1526 by Augustin Persaud RN  Outcome: Completed  4/26/2021 0756 by Augustin Persaud RN  Outcome: Ongoing  4/26/2021 0618 by Olga Bass RN  Outcome: Met This Shift     Problem: Musculor/Skeletal Functional Status  Goal: Highest potential functional level  4/26/2021 1526 by Augustin Persaud RN  Outcome: Completed  4/26/2021 0756 by Augustin Persaud RN  Outcome: Ongoing  Goal: Absence of falls  4/26/2021 1526 by Augustin Persaud RN  Outcome: Completed  4/26/2021 0756 by Augustin Persaud RN  Outcome: Ongoing     Problem: Skin Integrity:  Goal: Will show no infection signs and symptoms  Description: Will show no infection signs and symptoms  4/26/2021 1526 by Murlean Shutter, RN  Outcome: Completed  4/26/2021 0756 by Minh Becker RN  Outcome: Ongoing  Goal: Absence of new skin breakdown  Description: Absence of new skin breakdown  4/26/2021 1526 by Minh Becker RN  Outcome: Completed  4/26/2021 0756 by Minh Becker RN  Outcome: Ongoing

## 2021-04-26 NOTE — PROGRESS NOTES
COMPARISON: None. HISTORY: ORDERING SYSTEM PROVIDED HISTORY: trauma TECHNOLOGIST PROVIDED HISTORY: Reason for exam:->trauma What reading provider will be dictating this exam?->CRC FINDINGS: No fracture or dislocation. Osteoarthritis associated with femoroacetabular articulation. No lytic or blastic bone lesion. Vascular calcifications are present. No fracture or dislocation of left hip. Ct Head Wo Contrast    Result Date: 4/20/2021  EXAMINATION: CT OF THE HEAD WITHOUT CONTRAST  4/20/2021 9:19 pm TECHNIQUE: CT of the head was performed without the administration of intravenous contrast. Dose modulation, iterative reconstruction, and/or weight based adjustment of the mA/kV was utilized to reduce the radiation dose to as low as reasonably achievable. COMPARISON: No prior comparison available at this time. HISTORY: ORDERING SYSTEM PROVIDED HISTORY: trauma transfer from [place], known SDH. Right frontal parafalcine SDH 1.9cm x 0.6cm x 1.5cm, no shift (please compare) TECHNOLOGIST PROVIDED HISTORY: Has a \"code stroke\" or \"stroke alert\" been called? ->No Reason for exam:->trauma transfer from [place], known SDH. Right frontal parafalcine SDH 1.9cm x 0.6cm x 1.5cm, no shift (please compare) What reading provider will be dictating this exam?->CRC FINDINGS: Right parafalcine subdural hematoma measures approximately 7 mm in depth with minimal effacement of medial frontal gyrus E. no evidence of intracranial edema. No hydrocephalus. Areas of hypoattenuation are present in periventricular white matter suggestive of chronic microvascular ischemia. No depressed calvarial fracture. Hematoma overlies frontal calvarium. 1. Known right frontal parafalcine subdural hematoma measures approximately 7 mm in depth with minimal local gyral effacement. No prior exam available from outside facility. Short-term follow-up recommended. 2. No evidence of acute intracranial edema. 3. Hematoma overlies frontal calvarium.      Darren Fiore Chest Portable    Result Date: 4/20/2021  EXAMINATION: ONE XRAY VIEW OF THE CHEST 4/20/2021 7:09 pm COMPARISON: None. HISTORY: ORDERING SYSTEM PROVIDED HISTORY: trauma TECHNOLOGIST PROVIDED HISTORY: Reason for exam:->trauma What reading provider will be dictating this exam?->CRC FINDINGS: The lungs are without acute focal process. There is no effusion or pneumothorax. The cardiomediastinal silhouette is without acute process. The osseous structures are without acute process. No acute process.      CBC:   Lab Results   Component Value Date    WBC 5.7 04/22/2021    RBC 3.50 04/22/2021    HGB 9.6 04/22/2021    HCT 30.8 04/22/2021    MCV 88.0 04/22/2021    MCH 27.4 04/22/2021    MCHC 31.2 04/22/2021    RDW 13.5 04/22/2021     04/22/2021    MPV 9.6 04/22/2021     BMP:    Lab Results   Component Value Date     04/22/2021    K 4.4 04/22/2021     04/22/2021    CO2 28 04/22/2021    BUN 18 04/22/2021    LABALBU 3.1 04/20/2021    CREATININE 1.0 04/22/2021    CALCIUM 8.5 04/22/2021    GFRAA >60 04/22/2021    LABGLOM 54 04/22/2021    GLUCOSE 177 04/22/2021      clopidogrel  75 mg Oral Daily    insulin glargine  40 Units Subcutaneous QAM    melatonin  6 mg Oral Nightly    aspirin  81 mg Oral Daily    atorvastatin  40 mg Oral Daily    sodium chloride flush  10 mL Intravenous 2 times per day    bisacodyl  10 mg Oral Daily    sennosides-docusate sodium  2 tablet Oral BID    neomycin-bacitracin-polymyxin   Ophthalmic 4 times per day    neomycin-bacitracin-polymyxin   Topical BID    levETIRAcetam  500 mg Oral BID    insulin lispro  0-18 Units Subcutaneous TID WC    insulin lispro  0-9 Units Subcutaneous Nightly     Awake and alert, perrl eomi motor full  Assessment:  Patient Active Problem List   Diagnosis    Fall from standing    Concussion    Cephalohematoma    Facial abrasion, initial encounter    Subdural hematoma (HCC)     Plan:ASA/Plavix   Ramon Garcia M.D.

## 2021-04-26 NOTE — FLOWSHEET NOTE
Inpatient Wound Care(initial evaluation)  [id number]    Admit Date: 4/20/2021  5:30 PM    Reason for consult:  Right heel    Significant history:    CHIEF COMPLAINT:  Trauma consult. Injury occurred this morning. Pt is a 77year old female that was transferred from [facility]. She states that she had a mechanical fall this morning where she tripped over her feet. She states she hit her head, but did not lose consciousness. She is complaining of a mild headache and face pain. She also complains of some left hand pain from when she landed. She has a history of CAD s/p multiple stents and questionable Afib for which she takes ASA/Plavix and Eliquis.      Wound history:  Admitted with wound    Findings:     04/26/21 1210   Skin Integrity   Skin Integrity Bruising   Location face, BUE, hands   Skin Integrity Site 2   Skin Integrity Location 2   (dried scabs)   Location 2 dorsal toes left foot, left fingers, LLE   Skin Integrity Site 3   Skin Integrity Location 3   (soft, blanchable)    Location 3 bilateral heels   Wound 04/20/21 Heel Right   Date First Assessed/Time First Assessed: 04/20/21 2110   Present on Hospital Admission: Yes  Location: Heel  Wound Location Orientation: Right   Wound Image    Wound Etiology Pressure Stage  2  (by presentation today)   Dressing Status New dressing applied   Wound Cleansed Cleansed with saline   Dressing/Treatment Alginate;Dry dressing;Roll gauze   Wound Length (cm) 6 cm   Wound Width (cm) 3 cm   Wound Depth (cm) 0.1 cm   Wound Surface Area (cm^2) 18 cm^2   Change in Wound Size % (l*w) 0   Wound Volume (cm^3) 1.8 cm^3   Wound Healing % -900   Wound Assessment Pink/red   Drainage Amount Scant   Drainage Description Serosanguinous   Odor None   Svetlana-wound Assessment Intact   TMA right foot  **Informed Consent**    The patient has given verbal consent to have photos taken of wound and inserted into their chart as part of their permanent medical record for purposes of documentation, treatment management and/or medical review. All Images taken on 4/26/21 of patient name: [place] were transmitted and stored on secured [employer] located within [facility] by a registered [employer] Application Device.      Impression:  Right heel stage 2    Plan:  Opticell, DSD, Kerlix  Patient uses promogran at home- patient to continue after discharged  Will need continued preventative care  Dietary consult  Rafael Yeboah 4/26/2021 12:33 PM

## 2021-04-26 NOTE — PROGRESS NOTES
[facility]- transport . Updated Tessa Shah at the clinic for Dr. Amari Amaro he is also in charge of the [facility]- updated and questions answered.

## 2021-04-26 NOTE — PLAN OF CARE
Problem: Pain:  Goal: Pain level will decrease  Description: Pain level will decrease  4/26/2021 0756 by Nilson Ricardo RN  Outcome: Ongoing  4/26/2021 0618 by Deborah Min RN  Outcome: Met This Shift  Goal: Control of acute pain  Description: Control of acute pain  4/26/2021 0756 by Nilson Ricardo RN  Outcome: Ongoing  4/26/2021 0618 by Deborah Min RN  Outcome: Met This Shift  Goal: Control of chronic pain  Description: Control of chronic pain  4/26/2021 0756 by Nilson Ricardo RN  Outcome: Ongoing  4/26/2021 0618 by Deborah Min RN  Outcome: Met This Shift     Problem: Falls - Risk of:  Goal: Will remain free from falls  Description: Will remain free from falls  4/26/2021 0756 by Nilson Ricardo RN  Outcome: Ongoing  4/26/2021 0618 by Deborah Min RN  Outcome: Met This Shift  Goal: Absence of physical injury  Description: Absence of physical injury  4/26/2021 0756 by Nilson Ricardo RN  Outcome: Ongoing  4/26/2021 0618 by Deborah Min RN  Outcome: Met This Shift     Problem: Musculor/Skeletal Functional Status  Goal: Highest potential functional level  Outcome: Ongoing  Goal: Absence of falls  Outcome: Ongoing     Problem: Skin Integrity:  Goal: Will show no infection signs and symptoms  Description: Will show no infection signs and symptoms  Outcome: Ongoing  Goal: Absence of new skin breakdown  Description: Absence of new skin breakdown  Outcome: Ongoing

## 2021-04-26 NOTE — PROGRESS NOTES
CLINICAL PHARMACY NOTE: MEDS TO [address] Select Patient?: No  Total # of Prescriptions Filled: 1   The following medications were delivered to the patient:  · levetiracetam 500 mg  Total # of Interventions Completed: 3  Time Spent (min): 15    Additional Documentation:

## 2021-04-26 NOTE — PLAN OF CARE
Problem: Pain:  Goal: Pain level will decrease  Description: Pain level will decrease  4/26/2021 1526 by Delphine Lowe RN  Outcome: Completed  4/26/2021 0756 by Delphine Lowe RN  Outcome: Ongoing  4/26/2021 0618 by Kuldeep Palacios RN  Outcome: Met This Shift  Goal: Control of acute pain  Description: Control of acute pain  4/26/2021 1526 by Delphine Lowe RN  Outcome: Completed  4/26/2021 0756 by Delphine Lowe RN  Outcome: Ongoing  4/26/2021 0618 by Kuldeep Palacios RN  Outcome: Met This Shift  Goal: Control of chronic pain  Description: Control of chronic pain  4/26/2021 1526 by Delphine Lowe RN  Outcome: Completed  4/26/2021 0756 by Delphine Lowe RN  Outcome: Ongoing  4/26/2021 0618 by Kuldeep Palacios RN  Outcome: Met This Shift     Problem: Falls - Risk of:  Goal: Will remain free from falls  Description: Will remain free from falls  4/26/2021 1526 by Delphine Lowe RN  Outcome: Completed  4/26/2021 0756 by Delphine Lowe RN  Outcome: Ongoing  4/26/2021 0618 by Kuldeep Palacios RN  Outcome: Met This Shift  Goal: Absence of physical injury  Description: Absence of physical injury  4/26/2021 1526 by Delphine Lowe RN  Outcome: Completed  4/26/2021 0756 by Delphine Lowe RN  Outcome: Ongoing  4/26/2021 0618 by Kuldeep Palacios RN  Outcome: Met This Shift     Problem: Musculor/Skeletal Functional Status  Goal: Highest potential functional level  4/26/2021 1526 by Delphine Lowe RN  Outcome: Completed  4/26/2021 0756 by Delphine Lowe RN  Outcome: Ongoing  Goal: Absence of falls  4/26/2021 1526 by Delphine Lowe RN  Outcome: Completed  4/26/2021 0756 by Delphine Lowe RN  Outcome: Ongoing     Problem: Skin Integrity:  Goal: Will show no infection signs and symptoms  Description: Will show no infection signs and symptoms  4/26/2021 1526 by Delphine Lowe RN  Outcome: Completed  4/26/2021 0756 by Keysha Cruz RN  Outcome: Ongoing  Goal: Absence of new skin breakdown  Description: Absence of new skin breakdown  4/26/2021 1526 by Keysha Cruz RN  Outcome: Completed  4/26/2021 0756 by Keysha Cruz RN  Outcome: Ongoing

## 2021-04-26 NOTE — PROGRESS NOTES
Called Dr. Bruce Kessler office to [facility], to update on patient. Missed call prior. No answer at the office left voicemail for [facility] to call with questions. Discharge  time 1530. Discharge instructions explained no questions from the patient.  Waiting for meds to beds

## 2021-04-26 NOTE — CARE COORDINATION
Call placed to [facility] for transportation home per patient request [phone]. They state they will be here to  at 3:30p.

## 2021-04-26 NOTE — PROGRESS NOTES
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  *      ICH recent THEE; NS following, keppra, FU CT head repeat, ASA Plavix   Cardiac drug eluting stents, cards consulted, ASA Plavix   DM, BS control lantus SSI,   DC planning,       DVT prophylaxis asa plavix compression socks     Lawrence Alvarado MD FACS

## 2021-05-10 NOTE — PROGRESS NOTES
Physician Progress Note      James Mcadams  CSN #:                  [id number]  :                       1945  ADMIT DATE:       2021 5:30 PM  [facility] [place] DATE:        2021 3:41 PM  RESPONDING  PROVIDER #:        Cristin Brown MD          QUERY TEXT:    Dear Dr. Syl Dudley,    Patient admitted with SDH with documentation of \"She states she hit her head,   but did not lose consciousness\" in H&P on  . Noted documentation of   \"Sudden loss of consciousness\" in Cardiology consult note on 21. If   possible, please document in progress notes and discharge summary if you are   evaluating and /or treating any of the following: The medical record reflects the following:  Risk Factors: Fall with head injury  Clinical Indicators: Per H&P: \"She states that she had a mechanical fall this   morning where she tripped over her feet. She states she hit her head, but did   not lose consciousness\", Cardiology consult states: Fede Herron went to stand up from   sitting down while in her home and suddenly lost consciousness and fell to   the floor\", Per documentation in general surgery progress note on  pt   reports \"she got light headedness after going from seated to standing position   before the fall\"  Treatment: Cardiology and neurosurgery on care team, Followed by trauma team,   Close pt monitoring, repeated imaging to monitor SDH    Thank you,  Nikki DOLAN, RN  Clinical Documentation Improvement  [email or web address]  Office #: [phone]  Cell #: [phone]  Options provided:  -- Sudden loss of consciousness confirmed  -- Sudden loss of consciousness  ruled out  -- Other - I will add my own diagnosis  -- Disagree - Not applicable / Not valid  -- Disagree - Clinically unable to determine / Unknown  -- Refer to Clinical Documentation Reviewer    PROVIDER RESPONSE TEXT:    After study,Sudden loss of consciousness was confirmed.     Query created by: Diego Moreno on 5/10/2021 7:06 AM      Electronically signed by:  Priyank Blair MD 5/10/2021 11:21 AM